# Patient Record
Sex: FEMALE | Race: WHITE
[De-identification: names, ages, dates, MRNs, and addresses within clinical notes are randomized per-mention and may not be internally consistent; named-entity substitution may affect disease eponyms.]

---

## 2022-01-03 ENCOUNTER — APPOINTMENT (OUTPATIENT)
Dept: ULTRASOUND IMAGING | Facility: CLINIC | Age: 62
End: 2022-01-03
Payer: COMMERCIAL

## 2022-01-03 PROBLEM — Z00.00 ENCOUNTER FOR PREVENTIVE HEALTH EXAMINATION: Status: ACTIVE | Noted: 2022-01-03

## 2022-01-03 PROCEDURE — 93971 EXTREMITY STUDY: CPT | Mod: LT

## 2022-01-17 ENCOUNTER — TRANSCRIPTION ENCOUNTER (OUTPATIENT)
Age: 62
End: 2022-01-17

## 2023-06-08 ENCOUNTER — NON-APPOINTMENT (OUTPATIENT)
Age: 63
End: 2023-06-08

## 2023-10-02 ENCOUNTER — NON-APPOINTMENT (OUTPATIENT)
Age: 63
End: 2023-10-02

## 2024-01-31 ENCOUNTER — NON-APPOINTMENT (OUTPATIENT)
Age: 64
End: 2024-01-31

## 2024-09-21 ENCOUNTER — NON-APPOINTMENT (OUTPATIENT)
Age: 64
End: 2024-09-21

## 2025-03-14 ENCOUNTER — INPATIENT (INPATIENT)
Facility: HOSPITAL | Age: 65
LOS: 3 days | Discharge: HOME | DRG: 871 | End: 2025-03-18
Attending: HOSPITALIST | Admitting: INTERNAL MEDICINE
Payer: COMMERCIAL

## 2025-03-14 ENCOUNTER — NON-APPOINTMENT (OUTPATIENT)
Age: 65
End: 2025-03-14

## 2025-03-14 VITALS
TEMPERATURE: 101 F | OXYGEN SATURATION: 98 % | WEIGHT: 125 LBS | RESPIRATION RATE: 18 BRPM | SYSTOLIC BLOOD PRESSURE: 130 MMHG | HEART RATE: 96 BPM | HEIGHT: 70 IN | DIASTOLIC BLOOD PRESSURE: 72 MMHG

## 2025-03-14 DIAGNOSIS — K76.89 OTHER SPECIFIED DISEASES OF LIVER: ICD-10-CM

## 2025-03-14 DIAGNOSIS — Z98.890 OTHER SPECIFIED POSTPROCEDURAL STATES: Chronic | ICD-10-CM

## 2025-03-14 LAB
ALBUMIN SERPL ELPH-MCNC: 3.6 G/DL — SIGNIFICANT CHANGE UP (ref 3.3–5)
ALP SERPL-CCNC: 53 U/L — SIGNIFICANT CHANGE UP (ref 40–120)
ALT FLD-CCNC: 33 U/L — SIGNIFICANT CHANGE UP (ref 10–45)
ANION GAP SERPL CALC-SCNC: 9 MMOL/L — SIGNIFICANT CHANGE UP (ref 5–17)
APPEARANCE UR: CLEAR — SIGNIFICANT CHANGE UP
APTT BLD: 32 SEC — SIGNIFICANT CHANGE UP (ref 24.5–35.6)
AST SERPL-CCNC: 41 U/L — HIGH (ref 10–40)
BASOPHILS # BLD AUTO: 0.03 K/UL — SIGNIFICANT CHANGE UP (ref 0–0.2)
BASOPHILS NFR BLD AUTO: 0.2 % — SIGNIFICANT CHANGE UP (ref 0–2)
BILIRUB SERPL-MCNC: 0.4 MG/DL — SIGNIFICANT CHANGE UP (ref 0.2–1.2)
BILIRUB UR-MCNC: NEGATIVE — SIGNIFICANT CHANGE UP
BUN SERPL-MCNC: 9 MG/DL — SIGNIFICANT CHANGE UP (ref 7–23)
CALCIUM SERPL-MCNC: 8.7 MG/DL — SIGNIFICANT CHANGE UP (ref 8.4–10.5)
CHLORIDE SERPL-SCNC: 99 MMOL/L — SIGNIFICANT CHANGE UP (ref 96–108)
CO2 SERPL-SCNC: 26 MMOL/L — SIGNIFICANT CHANGE UP (ref 22–31)
COLOR SPEC: YELLOW — SIGNIFICANT CHANGE UP
CREAT SERPL-MCNC: 0.5 MG/DL — SIGNIFICANT CHANGE UP (ref 0.5–1.3)
DIFF PNL FLD: NEGATIVE — SIGNIFICANT CHANGE UP
EGFR: 105 ML/MIN/1.73M2 — SIGNIFICANT CHANGE UP
EGFR: 105 ML/MIN/1.73M2 — SIGNIFICANT CHANGE UP
EOSINOPHIL # BLD AUTO: 0 K/UL — SIGNIFICANT CHANGE UP (ref 0–0.5)
EOSINOPHIL NFR BLD AUTO: 0 % — SIGNIFICANT CHANGE UP (ref 0–6)
GLUCOSE SERPL-MCNC: 114 MG/DL — HIGH (ref 70–99)
GLUCOSE UR QL: NEGATIVE MG/DL — SIGNIFICANT CHANGE UP
HCT VFR BLD CALC: 35.1 % — SIGNIFICANT CHANGE UP (ref 34.5–45)
IMM GRANULOCYTES NFR BLD AUTO: 0.5 % — SIGNIFICANT CHANGE UP (ref 0–0.9)
INR BLD: 0.91 RATIO — SIGNIFICANT CHANGE UP (ref 0.85–1.16)
KETONES UR-MCNC: NEGATIVE MG/DL — SIGNIFICANT CHANGE UP
LEUKOCYTE ESTERASE UR-ACNC: NEGATIVE — SIGNIFICANT CHANGE UP
LYMPHOCYTES # BLD AUTO: 0.33 K/UL — LOW (ref 1–3.3)
LYMPHOCYTES # BLD AUTO: 2.2 % — LOW (ref 13–44)
MCHC RBC-ENTMCNC: 31.3 PG — SIGNIFICANT CHANGE UP (ref 27–34)
MCHC RBC-ENTMCNC: 33.6 G/DL — SIGNIFICANT CHANGE UP (ref 32–36)
MCV RBC AUTO: 93.1 FL — SIGNIFICANT CHANGE UP (ref 80–100)
MONOCYTES # BLD AUTO: 0.93 K/UL — HIGH (ref 0–0.9)
MONOCYTES NFR BLD AUTO: 6.1 % — SIGNIFICANT CHANGE UP (ref 2–14)
NEUTROPHILS NFR BLD AUTO: 91 % — HIGH (ref 43–77)
NRBC BLD AUTO-RTO: 0 /100 WBCS — SIGNIFICANT CHANGE UP (ref 0–0)
PH UR: 8 — SIGNIFICANT CHANGE UP (ref 5–8)
PLATELET # BLD AUTO: 189 K/UL — SIGNIFICANT CHANGE UP (ref 150–400)
POTASSIUM SERPL-MCNC: 3.8 MMOL/L — SIGNIFICANT CHANGE UP (ref 3.5–5.3)
PROT UR-MCNC: NEGATIVE MG/DL — SIGNIFICANT CHANGE UP
PROTHROM AB SERPL-ACNC: 10.7 SEC — SIGNIFICANT CHANGE UP (ref 9.9–13.4)
RBC # BLD: 3.77 M/UL — LOW (ref 3.8–5.2)
RBC # FLD: 12.1 % — SIGNIFICANT CHANGE UP (ref 10.3–14.5)
SODIUM SERPL-SCNC: 134 MMOL/L — LOW (ref 135–145)
SP GR SPEC: 1.01 — SIGNIFICANT CHANGE UP (ref 1–1.03)
UROBILINOGEN FLD QL: 0.2 MG/DL — SIGNIFICANT CHANGE UP (ref 0.2–1)
WBC # BLD: 15.13 K/UL — HIGH (ref 3.8–10.5)
WBC # FLD AUTO: 15.13 K/UL — HIGH (ref 3.8–10.5)

## 2025-03-14 PROCEDURE — 71045 X-RAY EXAM CHEST 1 VIEW: CPT | Mod: 26

## 2025-03-14 PROCEDURE — 99223 1ST HOSP IP/OBS HIGH 75: CPT

## 2025-03-14 PROCEDURE — 99285 EMERGENCY DEPT VISIT HI MDM: CPT

## 2025-03-14 PROCEDURE — 93010 ELECTROCARDIOGRAM REPORT: CPT

## 2025-03-14 RX ORDER — BUTYROSPERMUM PARKII(SHEA BUTTER), SIMMONDSIA CHINENSIS (JOJOBA) SEED OIL, ALOE BARBADENSIS LEAF EXTRACT .01; 1; 3.5 G/100G; G/100G; G/100G
250 LIQUID TOPICAL
Refills: 0 | Status: DISCONTINUED | OUTPATIENT
Start: 2025-03-14 | End: 2025-03-18

## 2025-03-14 RX ORDER — MAGNESIUM, ALUMINUM HYDROXIDE 200-200 MG
30 TABLET,CHEWABLE ORAL EVERY 4 HOURS
Refills: 0 | Status: DISCONTINUED | OUTPATIENT
Start: 2025-03-14 | End: 2025-03-18

## 2025-03-14 RX ORDER — ACETAMINOPHEN 500 MG/5ML
650 LIQUID (ML) ORAL EVERY 6 HOURS
Refills: 0 | Status: DISCONTINUED | OUTPATIENT
Start: 2025-03-14 | End: 2025-03-18

## 2025-03-14 RX ORDER — VANCOMYCIN HCL IN 5 % DEXTROSE 1.5G/250ML
750 PLASTIC BAG, INJECTION (ML) INTRAVENOUS EVERY 12 HOURS
Refills: 0 | Status: DISCONTINUED | OUTPATIENT
Start: 2025-03-14 | End: 2025-03-14

## 2025-03-14 RX ORDER — CLINDAMYCIN PHOSPHATE 150 MG/ML
600 VIAL (ML) INJECTION EVERY 8 HOURS
Refills: 0 | Status: DISCONTINUED | OUTPATIENT
Start: 2025-03-14 | End: 2025-03-14

## 2025-03-14 RX ORDER — LACTULOSE 10 G/15ML
40 SOLUTION ORAL
Refills: 0 | Status: DISCONTINUED | OUTPATIENT
Start: 2025-03-14 | End: 2025-03-18

## 2025-03-14 RX ORDER — ONDANSETRON HCL/PF 4 MG/2 ML
4 VIAL (ML) INJECTION EVERY 8 HOURS
Refills: 0 | Status: DISCONTINUED | OUTPATIENT
Start: 2025-03-14 | End: 2025-03-18

## 2025-03-14 RX ORDER — LACTULOSE 10 G/15ML
20 SOLUTION ORAL
Refills: 0 | Status: DISCONTINUED | OUTPATIENT
Start: 2025-03-15 | End: 2025-03-18

## 2025-03-14 RX ORDER — MELATONIN 5 MG
3 TABLET ORAL AT BEDTIME
Refills: 0 | Status: DISCONTINUED | OUTPATIENT
Start: 2025-03-14 | End: 2025-03-18

## 2025-03-14 RX ORDER — CYANOCOBALAMIN 1000 UG/ML
1000 INJECTION INTRAMUSCULAR; SUBCUTANEOUS DAILY
Refills: 0 | Status: DISCONTINUED | OUTPATIENT
Start: 2025-03-15 | End: 2025-03-18

## 2025-03-14 RX ORDER — TRAMADOL HYDROCHLORIDE 50 MG/1
50 TABLET, FILM COATED ORAL EVERY 6 HOURS
Refills: 0 | Status: DISCONTINUED | OUTPATIENT
Start: 2025-03-14 | End: 2025-03-18

## 2025-03-14 RX ORDER — SPIRONOLACTONE 25 MG
12.5 TABLET ORAL
Refills: 0 | Status: DISCONTINUED | OUTPATIENT
Start: 2025-03-14 | End: 2025-03-18

## 2025-03-14 RX ORDER — ENOXAPARIN SODIUM 100 MG/ML
30 INJECTION SUBCUTANEOUS EVERY 24 HOURS
Refills: 0 | Status: DISCONTINUED | OUTPATIENT
Start: 2025-03-14 | End: 2025-03-15

## 2025-03-14 RX ORDER — B1/B2/B3/B5/B6/B12/VIT C/FOLIC 500-0.5 MG
1 TABLET ORAL DAILY
Refills: 0 | Status: DISCONTINUED | OUTPATIENT
Start: 2025-03-14 | End: 2025-03-18

## 2025-03-14 RX ORDER — CALCIUM CARBONATE/VITAMIN D3 500MG-5MCG
1 TABLET ORAL
Refills: 0 | Status: DISCONTINUED | OUTPATIENT
Start: 2025-03-14 | End: 2025-03-18

## 2025-03-14 RX ORDER — CEFAZOLIN SODIUM IN 0.9 % NACL 3 G/100 ML
2000 INTRAVENOUS SOLUTION, PIGGYBACK (ML) INTRAVENOUS EVERY 8 HOURS
Refills: 0 | Status: DISCONTINUED | OUTPATIENT
Start: 2025-03-14 | End: 2025-03-16

## 2025-03-14 RX ORDER — KETOROLAC TROMETHAMINE 30 MG/ML
30 INJECTION, SOLUTION INTRAMUSCULAR; INTRAVENOUS ONCE
Refills: 0 | Status: DISCONTINUED | OUTPATIENT
Start: 2025-03-14 | End: 2025-03-14

## 2025-03-14 RX ORDER — CLINDAMYCIN PHOSPHATE 150 MG/ML
900 VIAL (ML) INJECTION ONCE
Refills: 0 | Status: COMPLETED | OUTPATIENT
Start: 2025-03-14 | End: 2025-03-14

## 2025-03-14 RX ORDER — FOLIC ACID 1 MG/1
1 TABLET ORAL DAILY
Refills: 0 | Status: DISCONTINUED | OUTPATIENT
Start: 2025-03-14 | End: 2025-03-18

## 2025-03-14 RX ORDER — FUROSEMIDE 10 MG/ML
10 INJECTION INTRAMUSCULAR; INTRAVENOUS
Refills: 0 | Status: DISCONTINUED | OUTPATIENT
Start: 2025-03-17 | End: 2025-03-16

## 2025-03-14 RX ORDER — KETOROLAC TROMETHAMINE 30 MG/ML
15 INJECTION, SOLUTION INTRAMUSCULAR; INTRAVENOUS EVERY 8 HOURS
Refills: 0 | Status: DISCONTINUED | OUTPATIENT
Start: 2025-03-14 | End: 2025-03-16

## 2025-03-14 RX ADMIN — BUTYROSPERMUM PARKII(SHEA BUTTER), SIMMONDSIA CHINENSIS (JOJOBA) SEED OIL, ALOE BARBADENSIS LEAF EXTRACT 250 MILLIGRAM(S): .01; 1; 3.5 LIQUID TOPICAL at 21:30

## 2025-03-14 RX ADMIN — Medication 100 MILLIGRAM(S): at 17:24

## 2025-03-14 RX ADMIN — Medication 1750 MILLILITER(S): at 18:30

## 2025-03-14 RX ADMIN — KETOROLAC TROMETHAMINE 30 MILLIGRAM(S): 30 INJECTION, SOLUTION INTRAMUSCULAR; INTRAVENOUS at 17:29

## 2025-03-14 RX ADMIN — KETOROLAC TROMETHAMINE 30 MILLIGRAM(S): 30 INJECTION, SOLUTION INTRAMUSCULAR; INTRAVENOUS at 18:30

## 2025-03-14 RX ADMIN — ENOXAPARIN SODIUM 30 MILLIGRAM(S): 100 INJECTION SUBCUTANEOUS at 21:29

## 2025-03-14 RX ADMIN — Medication 1750 MILLILITER(S): at 17:24

## 2025-03-14 RX ADMIN — KETOROLAC TROMETHAMINE 15 MILLIGRAM(S): 30 INJECTION, SOLUTION INTRAMUSCULAR; INTRAVENOUS at 23:35

## 2025-03-14 RX ADMIN — Medication 100 MILLIGRAM(S): at 21:24

## 2025-03-14 RX ADMIN — KETOROLAC TROMETHAMINE 15 MILLIGRAM(S): 30 INJECTION, SOLUTION INTRAMUSCULAR; INTRAVENOUS at 22:45

## 2025-03-14 NOTE — ED PROVIDER NOTE - PHYSICAL EXAMINATION
Gen: Well appearing in NAD  Head: NC/AT  Neck: trachea midline  Resp:  No distress  Ext: no deformities  Neuro:  A&O appears non focal  Skin:  Warm and dry as visualized, right lower forearm with erythema, warmth tenderness and swelling. Signs of lymphangitis up the arm noted as well. DIstal neurovasc intact.

## 2025-03-14 NOTE — CONSULT NOTE ADULT - SUBJECTIVE AND OBJECTIVE BOX
HPI:   Patient is a 65y female with history of liver disease from hyperplasia that has improved who developed acute onset of red swollen right arm starting this morning. She has fever. Yesterday she felt a little woozy when she working on her mother's wounds. No animal exposure. No definite trauma but has some cuts on the hands. No n,v,d. She may have had this in the past     REVIEW OF SYSTEMS:  All other review of systems negative (Comprehensive ROS)    PAST MEDICAL & SURGICAL HISTORY:  Hepatic focal nodular hyperplasia      H/O hernia repair      S/P ORIF (open reduction internal fixation) fracture          Allergies    penicillin (Unknown)    Intolerances        Antimicrobials Day #  :1  ceFAZolin   IVPB 2000 milliGRAM(s) IV Intermittent every 8 hours    Other Medications:  acetaminophen     Tablet .. 650 milliGRAM(s) Oral every 6 hours PRN  aluminum hydroxide/magnesium hydroxide/simethicone Suspension 30 milliLiter(s) Oral every 4 hours PRN  calcium carbonate 1250 mG  + Vitamin D (OsCal 500 + D) 1 Tablet(s) Oral two times a day  enoxaparin Injectable 30 milliGRAM(s) SubCutaneous every 24 hours  folic acid 1 milliGRAM(s) Oral daily  furosemide    Tablet 10 milliGRAM(s) Oral <User Schedule>  ketorolac   Injectable 15 milliGRAM(s) IV Push every 8 hours PRN  lactulose Syrup 20 Gram(s) Oral <User Schedule>  lactulose Syrup 40 Gram(s) Oral <User Schedule>  melatonin 3 milliGRAM(s) Oral at bedtime PRN  multivitamin 1 Tablet(s) Oral daily  ondansetron Injectable 4 milliGRAM(s) IV Push every 8 hours PRN  saccharomyces boulardii 250 milliGRAM(s) Oral two times a day  spironolactone 12.5 milliGRAM(s) Oral <User Schedule>  traMADol 50 milliGRAM(s) Oral every 6 hours PRN      FAMILY HISTORY:  FH: melanoma (Mother)    FH: HTN (hypertension) (Mother)    FH: breast cancer (Mother)        SOCIAL HISTORY:  Smoking: [ ]Yes [ x]No  ETOH: [ ]Yes [x ]No  Drug Use: [ ]Yes [ ]xNo   [x ] Single[ ]    T(F): 99.2 (25 @ 19:42), Max: 100.8 (25 @ 15:56)  HR: 81 (25 @ 19:42)  BP: 135/78 (25 @ 19:42)  RR: 17 (25 @ 19:42)  SpO2: 99% (25 @ 19:42)  Wt(kg): --    PHYSICAL EXAM:  General: alert, no acute distress  Eyes:  anicteric, no conjunctival injection, no discharge  Oropharynx: no lesions or injection 	  Neck: supple, without adenopathy  Abdomen: , nondistended, Skin: no lesions  Extremities: right arm is red hand to elbow. no bulla  Neurologic: alert, oriented, moves all extremities    LAB RESULTS:                        11.8   15.13 )-----------( 189      ( 14 Mar 2025 17:00 )             35.1         134[L]  |  99  |  9   ----------------------------<  114[H]  3.8   |  26  |  0.50    Ca    8.7      14 Mar 2025 17:00    TPro  6.5  /  Alb  3.6  /  TBili  0.4  /  DBili  x   /  AST  41[H]  /  ALT  33  /  AlkPhos  53      LIVER FUNCTIONS - ( 14 Mar 2025 17:00 )  Alb: 3.6 g/dL / Pro: 6.5 g/dL / ALK PHOS: 53 U/L / ALT: 33 U/L / AST: 41 U/L / GGT: x           Urinalysis Basic - ( 14 Mar 2025 18:14 )    Color: Yellow / Appearance: Clear / S.006 / pH: x  Gluc: x / Ketone: Negative mg/dL  / Bili: Negative / Urobili: 0.2 mg/dL   Blood: x / Protein: Negative mg/dL / Nitrite: Negative   Leuk Esterase: Negative / RBC: x / WBC x   Sq Epi: x / Non Sq Epi: x / Bacteria: x        MICROBIOLOGY:  RECENT CULTURES:        RADIOLOGY REVIEWED:  < from: Xray Chest 1 View AP/PA (25 @ 17:19) >  ACC: [id number] EXAM:  XR CHEST AP OR PA 1V   ORDERED BY: RIO JAMA     PROCEDURE DATE:  2025          INTERPRETATION:  AP chest on 2025 5:08 PM. Patient has sepsis.    COMPARISON: None available.    Heart size is within normal limits.    Lungs are clear.    IMPRESSION: Negative chest.    --- End of Report ---      < end of copied text >    Impression:  65y female with history of liver disease from hyperplasia that has improved who developed acute onset of red swollen right arm starting this morning. She has fever. Yesterday she felt a little woozy when she working on her mother's wounds. No animal exposure. No definite trauma but has some cuts on the hands. No n,v,d. She may have had this in the past . Patient with cellulitis of the right arm. Tempo is most consistent with beta strept given how fast it evolved. Patient with penicillin allergy but tolerates keflex.   Recommendations:  treat with cefazolin  elevate arm  f/u blood cx

## 2025-03-14 NOTE — H&P ADULT - ASSESSMENT
64 yo F with history of hepatic nodular hyperplasia presents for one day history of R lower arm pain, erythema and swelling.  Pt woke up with Right lower arm pain. Then redness and swelling of the arm worsened rapidly with associated fever and chills which prompted pt to come to the ED.      Sepsis POA (fever and leukocytosis) due to right lower arm cellulitis   - cont IV Vancomycin   - ID consult  - f/u blood cx  - probiotics    Hepatic nodular hyperplasia  - takes lactulose 30ml in AM, 60mg in PM, lasix 10mg daily (Mon to Wed), Spironolactone 12.5mg (Mon to Wed)  - c/w home supplement: vit B1, vit B12, folic acid, multivitamin, Sha- vit D    DVT ppx: lovenox subq

## 2025-03-14 NOTE — H&P ADULT - NSHPLABSRESULTS_GEN_ALL_CORE
LABS:                        11.8   15.13 )-----------( 189      ( 14 Mar 2025 17:00 )             35.1     03-14    134[L]  |  99  |  9   ----------------------------<  114[H]  3.8   |  26  |  0.50    Ca    8.7      14 Mar 2025 17:00    TPro  6.5  /  Alb  3.6  /  TBili  0.4  /  DBili  x   /  AST  41[H]  /  ALT  33  /  AlkPhos  53  03-14    PT/INR - ( 14 Mar 2025 17:00 )   PT: 10.7 sec;   INR: 0.91 ratio         PTT - ( 14 Mar 2025 17:00 )  PTT:32.0 sec  Urinalysis Basic - ( 14 Mar 2025 18:14 )    Color: Yellow / Appearance: Clear / S.006 / pH: x  Gluc: x / Ketone: Negative mg/dL  / Bili: Negative / Urobili: 0.2 mg/dL   Blood: x / Protein: Negative mg/dL / Nitrite: Negative   Leuk Esterase: Negative / RBC: x / WBC x   Sq Epi: x / Non Sq Epi: x / Bacteria: x       CAPILLARY BLOOD GLUCOSE      Urinalysis Basic - ( 14 Mar 2025 18:14 )    Color: Yellow / Appearance: Clear / S.006 / pH: x  Gluc: x / Ketone: Negative mg/dL  / Bili: Negative / Urobili: 0.2 mg/dL   Blood: x / Protein: Negative mg/dL / Nitrite: Negative   Leuk Esterase: Negative / RBC: x / WBC x   Sq Epi: x / Non Sq Epi: x / Bacteria: x        RADIOLOGY & ADDITIONAL TESTS:    Consultant(s) Notes Reviewed:  [x ] YES  [ ] NO  Care Discussed with Consultants/Other Providers [ x] YES  [ ] NO spoke to Dr. Brown. cont IV abx  Imaging Personally Reviewed:  [ ] YES  [ ] NO

## 2025-03-14 NOTE — ED PROVIDER NOTE - OBJECTIVE STATEMENT
66 yo F with history of hepatic nodular hyperplasia presents for one day history of R lower arm pain, erythema and swelling. Pt reports that her mother has a leg wound with purulent drainage and patient has been helping with wound care. This morning, pt woke up with Right lower arm pain. Then redness and swelling of the arm worsened rapidly with associated fever and chills which prompted pt to come to the ED. Pt feels infection may have entered within her nails as she was really getting into the wounds with her bare hands. + fever.

## 2025-03-14 NOTE — H&P ADULT - HISTORY OF PRESENT ILLNESS
RUE cellulitis  64 yo F with history of hepatic nodular hyperplasia presents for one day history of R lower arm pain, erythema and swelling. Pt reports that her mother has a leg wound with purulent drainage and patient has been helping with wound care. This morning, pt woke up with Right lower arm pain. The arm quickly became red and swollen with associated fever and chills which prompted pt to come to the ED.  In ED, pt was febrile to 100.8, WBC 15. found with R arm cellulitis. s/p IV clindamycin x1 and IVF bolus. 66 yo F with history of hepatic nodular hyperplasia presents for one day history of R lower arm pain, erythema and swelling. Pt reports that her mother has a leg wound with purulent drainage and patient has been helping with wound care. This morning, pt woke up with Right lower arm pain. Then redness and swelling of the arm worsened rapidly with associated fever and chills which prompted pt to come to the ED.  In ED, pt was febrile to 100.8, WBC 15. found with R arm cellulitis. s/p IV clindamycin x1 and IVF bolus.

## 2025-03-14 NOTE — PATIENT PROFILE ADULT - FUNCTIONAL SCREEN CURRENT LEVEL: COMMUNICATION, MLM
Patient understands there is an increased risk of corneal edema after cataract surgery. 0 = understands/communicates without difficulty

## 2025-03-14 NOTE — ED PROVIDER NOTE - DISPOSITION TYPE
ED to Inpatient Handoff Report    Notified jesus that electronic handoff available and patient ready for transport to room 0620.    Safety Risks: None identified and Risk of falls    Patient in Restraints: no    Constant Observer or Patient : no    Telemetry Monitoring Ordered :No           Order to transfer to unit without monitor:N/A    Last MEWS: 01 Time completed: 1906    Deterioration Index Score:   Predictive Model Details          23 (Normal)  Factor Value    Calculated 12/25/2024 19:25 67% Age 89 years old    Deterioration Index Model 11% Respiratory rate 18     8% Potassium 4.3 mmol/L     7% Systolic 141     6% Sodium 142 mmol/L     1% Temperature 99 °F (37.2 °C)     1% Pulse 82     0% Pulse oximetry 95 %     0% Hematocrit 36.5 %     0% WBC count 7.1 k/uL        Vitals:    12/25/24 1650 12/25/24 1906   BP: (!) 154/66 (!) 141/70   Pulse: (!) 105 82   Resp: 18 18   Temp: 97.4 °F (36.3 °C) 99 °F (37.2 °C)   TempSrc: Oral Oral   SpO2: 96% 95%   Weight: 74.4 kg (164 lb)    Height: 1.499 m (4' 11\")          Opportunity for questions and clarification was provided.      No ADMIT

## 2025-03-14 NOTE — ED ADULT NURSE NOTE - OBJECTIVE STATEMENT
Patient presents to ED complaining of right arm redness, pain, and swelling. Patient's mother is admitted with similar symptoms. Patient states she was taking care of mother and symptoms came on suddenly today. patient febrile in triage. Patient states history of liver disease. Patient denies chest pain, denies shortness of breath.

## 2025-03-14 NOTE — ED ADULT NURSE NOTE - NSFALLUNIVINTERV_ED_ALL_ED
Bed/Stretcher in lowest position, wheels locked, appropriate side rails in place/Call bell, personal items and telephone in reach/Instruct patient to call for assistance before getting out of bed/chair/stretcher/Non-slip footwear applied when patient is off stretcher/[place] to call system/Physically safe environment - no spills, clutter or unnecessary equipment/Purposeful proactive rounding/Room/bathroom lighting operational, light cord in reach

## 2025-03-14 NOTE — PATIENT PROFILE ADULT - FALL HARM RISK - UNIVERSAL INTERVENTIONS
Bed in lowest position, wheels locked, appropriate side rails in place/Call bell, personal items and telephone in reach/Instruct patient to call for assistance before getting out of bed or chair/Non-slip footwear when patient is out of bed/[place] to call system/Physically safe environment - no spills, clutter or unnecessary equipment/Purposeful Proactive Rounding/Room/bathroom lighting operational, light cord in reach

## 2025-03-14 NOTE — ED ADULT NURSE NOTE - NSICDXFAMILYHX_GEN_ALL_CORE_FT
FAMILY HISTORY:  Mother  Still living? Unknown  FH: breast cancer, Age at diagnosis: Age Unknown  FH: HTN (hypertension), Age at diagnosis: Age Unknown  FH: melanoma, Age at diagnosis: Age Unknown

## 2025-03-14 NOTE — ED ADULT NURSE NOTE - NSICDXPASTSURGICALHX_GEN_ALL_CORE_FT
PAST SURGICAL HISTORY:  H/O hernia repair     S/P ORIF (open reduction internal fixation) fracture

## 2025-03-14 NOTE — ED PROVIDER NOTE - CLINICAL SUMMARY MEDICAL DECISION MAKING FREE TEXT BOX
[FreeTextEntry1] : Continue symptomatic treatment with Allegra\par Short interval follow-up if symptoms do not improve\par Patient is in agreement with moderate intensity statin therapy
[FreeTextEntry1] : Continue symptomatic treatment with Allegra\par Short interval follow-up if symptoms do not improve\par Patient is in agreement with moderate intensity statin therapy
66 yo F with history of hepatic nodular hyperplasia presents for one day history of R lower arm pain, erythema and swelling. Pt reports that her mother has a leg wound with purulent drainage and patient has been helping with wound care. This morning, pt woke up with Right lower arm pain. Then redness and swelling of the arm worsened rapidly with associated fever and chills which prompted pt to come to the ED. Pt feels infection may have entered within her nails as she was really getting into the wounds with her bare hands. + fever.   Exam as stated. Plan for admission for IV abx for advanced cellulitis with signs of systemic sx (fever, lymphangitis)

## 2025-03-15 LAB
ANION GAP SERPL CALC-SCNC: 7 MMOL/L — SIGNIFICANT CHANGE UP (ref 5–17)
BASOPHILS # BLD AUTO: 0.03 K/UL — SIGNIFICANT CHANGE UP (ref 0–0.2)
BASOPHILS NFR BLD AUTO: 0.3 % — SIGNIFICANT CHANGE UP (ref 0–2)
BUN SERPL-MCNC: 10 MG/DL — SIGNIFICANT CHANGE UP (ref 7–23)
CALCIUM SERPL-MCNC: 8 MG/DL — LOW (ref 8.4–10.5)
CHLORIDE SERPL-SCNC: 102 MMOL/L — SIGNIFICANT CHANGE UP (ref 96–108)
CREAT SERPL-MCNC: 0.47 MG/DL — LOW (ref 0.5–1.3)
EGFR: 106 ML/MIN/1.73M2 — SIGNIFICANT CHANGE UP
EGFR: 106 ML/MIN/1.73M2 — SIGNIFICANT CHANGE UP
EOSINOPHIL # BLD AUTO: 0.01 K/UL — SIGNIFICANT CHANGE UP (ref 0–0.5)
EOSINOPHIL NFR BLD AUTO: 0.1 % — SIGNIFICANT CHANGE UP (ref 0–6)
GLUCOSE SERPL-MCNC: 101 MG/DL — HIGH (ref 70–99)
HCT VFR BLD CALC: 32.6 % — LOW (ref 34.5–45)
HGB BLD-MCNC: 10.6 G/DL — LOW (ref 11.5–15.5)
IMM GRANULOCYTES NFR BLD AUTO: 0.2 % — SIGNIFICANT CHANGE UP (ref 0–0.9)
LYMPHOCYTES # BLD AUTO: 0.43 K/UL — LOW (ref 1–3.3)
LYMPHOCYTES # BLD AUTO: 4.5 % — LOW (ref 13–44)
MCHC RBC-ENTMCNC: 30.7 PG — SIGNIFICANT CHANGE UP (ref 27–34)
MCHC RBC-ENTMCNC: 32.5 G/DL — SIGNIFICANT CHANGE UP (ref 32–36)
MCV RBC AUTO: 94.5 FL — SIGNIFICANT CHANGE UP (ref 80–100)
MONOCYTES # BLD AUTO: 0.53 K/UL — SIGNIFICANT CHANGE UP (ref 0–0.9)
MONOCYTES NFR BLD AUTO: 5.5 % — SIGNIFICANT CHANGE UP (ref 2–14)
NEUTROPHILS # BLD AUTO: 8.6 K/UL — HIGH (ref 1.8–7.4)
NEUTROPHILS NFR BLD AUTO: 89.4 % — HIGH (ref 43–77)
NRBC BLD AUTO-RTO: 0 /100 WBCS — SIGNIFICANT CHANGE UP (ref 0–0)
PLATELET # BLD AUTO: 158 K/UL — SIGNIFICANT CHANGE UP (ref 150–400)
POTASSIUM SERPL-MCNC: 4 MMOL/L — SIGNIFICANT CHANGE UP (ref 3.5–5.3)
RBC # BLD: 3.45 M/UL — LOW (ref 3.8–5.2)
RBC # FLD: 12.2 % — SIGNIFICANT CHANGE UP (ref 10.3–14.5)
SODIUM SERPL-SCNC: 134 MMOL/L — LOW (ref 135–145)
WBC # BLD: 9.62 K/UL — SIGNIFICANT CHANGE UP (ref 3.8–10.5)
WBC # FLD AUTO: 9.62 K/UL — SIGNIFICANT CHANGE UP (ref 3.8–10.5)

## 2025-03-15 PROCEDURE — 99233 SBSQ HOSP IP/OBS HIGH 50: CPT

## 2025-03-15 PROCEDURE — 73090 X-RAY EXAM OF FOREARM: CPT | Mod: 26,LT

## 2025-03-15 RX ORDER — LEVOTHYROXINE SODIUM 300 MCG
50 TABLET ORAL AT BEDTIME
Refills: 0 | Status: DISCONTINUED | OUTPATIENT
Start: 2025-03-15 | End: 2025-03-18

## 2025-03-15 RX ORDER — HYPROMELLOSE 0.4 %
1 DROPS OPHTHALMIC (EYE)
Refills: 0 | Status: DISCONTINUED | OUTPATIENT
Start: 2025-03-15 | End: 2025-03-18

## 2025-03-15 RX ORDER — SENNA 187 MG
2 TABLET ORAL AT BEDTIME
Refills: 0 | Status: DISCONTINUED | OUTPATIENT
Start: 2025-03-15 | End: 2025-03-18

## 2025-03-15 RX ADMIN — KETOROLAC TROMETHAMINE 15 MILLIGRAM(S): 30 INJECTION, SOLUTION INTRAMUSCULAR; INTRAVENOUS at 23:21

## 2025-03-15 RX ADMIN — BUTYROSPERMUM PARKII(SHEA BUTTER), SIMMONDSIA CHINENSIS (JOJOBA) SEED OIL, ALOE BARBADENSIS LEAF EXTRACT 250 MILLIGRAM(S): .01; 1; 3.5 LIQUID TOPICAL at 05:51

## 2025-03-15 RX ADMIN — KETOROLAC TROMETHAMINE 15 MILLIGRAM(S): 30 INJECTION, SOLUTION INTRAMUSCULAR; INTRAVENOUS at 22:37

## 2025-03-15 RX ADMIN — Medication 100 MILLIGRAM(S): at 21:07

## 2025-03-15 RX ADMIN — Medication 1 TABLET(S): at 11:12

## 2025-03-15 RX ADMIN — LACTULOSE 40 GRAM(S): 10 SOLUTION ORAL at 17:18

## 2025-03-15 RX ADMIN — BUTYROSPERMUM PARKII(SHEA BUTTER), SIMMONDSIA CHINENSIS (JOJOBA) SEED OIL, ALOE BARBADENSIS LEAF EXTRACT 250 MILLIGRAM(S): .01; 1; 3.5 LIQUID TOPICAL at 17:16

## 2025-03-15 RX ADMIN — CYANOCOBALAMIN 1000 MICROGRAM(S): 1000 INJECTION INTRAMUSCULAR; SUBCUTANEOUS at 11:12

## 2025-03-15 RX ADMIN — Medication 50 MICROGRAM(S): at 21:08

## 2025-03-15 RX ADMIN — Medication 100 MILLIGRAM(S): at 13:15

## 2025-03-15 RX ADMIN — Medication 2 TABLET(S): at 21:08

## 2025-03-15 RX ADMIN — Medication 1 TABLET(S): at 05:51

## 2025-03-15 RX ADMIN — FOLIC ACID 1 MILLIGRAM(S): 1 TABLET ORAL at 11:13

## 2025-03-15 RX ADMIN — Medication 1 DROP(S): at 17:49

## 2025-03-15 RX ADMIN — Medication 100 MILLIGRAM(S): at 05:50

## 2025-03-15 RX ADMIN — Medication 1 TABLET(S): at 17:17

## 2025-03-15 RX ADMIN — LACTULOSE 20 GRAM(S): 10 SOLUTION ORAL at 05:50

## 2025-03-15 NOTE — CHART NOTE - NSCHARTNOTEFT_GEN_A_CORE
Per Plastics attending - xray of right arm and duplex ordered  Will consider hand consult in am  Will exam patients arm overnight to monitor for compartment syndrome (low suspicion)

## 2025-03-15 NOTE — DIETITIAN INITIAL EVALUATION ADULT - PERTINENT LABORATORY DATA
03-15    134[L]  |  102  |  10  ----------------------------<  101[H]  4.0   |  25  |  0.47[L]    Ca    8.0[L]      15 Mar 2025 05:25    TPro  6.5  /  Alb  3.6  /  TBili  0.4  /  DBili  x   /  AST  41[H]  /  ALT  33  /  AlkPhos  53  03-14

## 2025-03-15 NOTE — DIETITIAN INITIAL EVALUATION ADULT - PERTINENT MEDS FT
MEDICATIONS  (STANDING):  artificial  tears Solution 1 Drop(s) Both EYES two times a day  calcium carbonate 1250 mG  + Vitamin D (OsCal 500 + D) 1 Tablet(s) Oral two times a day  ceFAZolin   IVPB 2000 milliGRAM(s) IV Intermittent every 8 hours  cyanocobalamin 1000 MICROGram(s) Oral daily  folic acid 1 milliGRAM(s) Oral daily  furosemide    Tablet 10 milliGRAM(s) Oral <User Schedule>  lactulose Syrup 20 Gram(s) Oral <User Schedule>  lactulose Syrup 40 Gram(s) Oral <User Schedule>  levothyroxine 50 MICROGram(s) Oral at bedtime  multivitamin 1 Tablet(s) Oral daily  saccharomyces boulardii 250 milliGRAM(s) Oral two times a day  spironolactone 12.5 milliGRAM(s) Oral <User Schedule>    MEDICATIONS  (PRN):  acetaminophen     Tablet .. 650 milliGRAM(s) Oral every 6 hours PRN Temp greater or equal to 38C (100.4F), Mild Pain (1 - 3)  aluminum hydroxide/magnesium hydroxide/simethicone Suspension 30 milliLiter(s) Oral every 4 hours PRN Dyspepsia  ketorolac   Injectable 15 milliGRAM(s) IV Push every 8 hours PRN Moderate Pain (4 - 6)  melatonin 3 milliGRAM(s) Oral at bedtime PRN Insomnia  ondansetron Injectable 4 milliGRAM(s) IV Push every 8 hours PRN Nausea and/or Vomiting  traMADol 50 milliGRAM(s) Oral every 6 hours PRN Severe Pain (7 - 10)

## 2025-03-15 NOTE — DIETITIAN INITIAL EVALUATION ADULT - ORAL INTAKE PTA/DIET HISTORY
Pt reports following a predominantly vegan diet PTA- will occasionally eat eggs or dairy, never eats meat/fish. Pt states that she makes an effort to fortify her diet in order to meet protein requirements, although admits she can be a light eater at times. Supplements with vegan protein powders, vegan "jerky sticks", evolve protein drink. Micronutrients PTA: MVI, Vitamin B12, thiamine, folic acid, Vitamin D3 +Calcium. Vitamin C 500mg BID.

## 2025-03-15 NOTE — DIETITIAN INITIAL EVALUATION ADULT - ETIOLOGY
related to inadequate protein-energy intake in setting of chronic hypocaloric intake with vegan diet

## 2025-03-15 NOTE — DIETITIAN INITIAL EVALUATION ADULT - ADD RECOMMEND
1. Regular, lacto-ovo vegetarian + Radha Granado Standard 1.0 qd  2. Continue MVI po daily- add Vitamin C 500mg BID

## 2025-03-15 NOTE — DIETITIAN NUTRITION RISK NOTIFICATION - TREATMENT: THE FOLLOWING DIET HAS BEEN RECOMMENDED
Diet, Regular:   Lacto-Ovo Veg (Accepts Milk Prod., Eggs)  Supplement Feeding Modality:  Oral  Ensure Plant-Based Cans or Servings Per Day:  1       Frequency:  Daily (03-15-25 @ 09:50) [Active]

## 2025-03-15 NOTE — DIETITIAN INITIAL EVALUATION ADULT - NS FNS DIET ORDER
Diet, Regular:   Lacto-Ovo Veg (Accepts Milk Prod., Eggs)  Supplement Feeding Modality:  Oral  Ensure Plant-Based Cans or Servings Per Day:  1       Frequency:  Daily (03-15-25 @ 09:50)

## 2025-03-15 NOTE — DIETITIAN INITIAL EVALUATION ADULT - OTHER INFO
64 yo F with history of hepatic nodular hyperplasia presents for one day history of R lower arm pain, erythema and swelling.  Pt woke up with Right lower arm pain. Then redness and swelling of the arm worsened rapidly with associated fever and chills which prompted pt to come to the ED.  Pt tolerating diet with report of good appetite- will adjust diet for vegan/vegetarian food preferences + pt receptive to Averail vegan oral nutrition supplements. Reports UBW 125lbs- denies any recent weight change, pt lean at baseline. NFPE with evidence of severe muscle wasting/fat loss. +1 edema right arm. Cellulitis right arm. Pt denies N/V/D, constipation. Recommend change diet to Regular, lacto-ovo vegetarian + Averail standard 1.0 qd. Continue MVI, add Vitamin C 500mg BID (pt's baseline regimen).

## 2025-03-16 LAB
BUN SERPL-MCNC: 6 MG/DL — LOW (ref 7–23)
CALCIUM SERPL-MCNC: 8 MG/DL — LOW (ref 8.4–10.5)
CHLORIDE SERPL-SCNC: 102 MMOL/L — SIGNIFICANT CHANGE UP (ref 96–108)
CO2 SERPL-SCNC: 24 MMOL/L — SIGNIFICANT CHANGE UP (ref 22–31)
CREAT SERPL-MCNC: 0.38 MG/DL — LOW (ref 0.5–1.3)
EGFR: 112 ML/MIN/1.73M2 — SIGNIFICANT CHANGE UP
EGFR: 112 ML/MIN/1.73M2 — SIGNIFICANT CHANGE UP
GLUCOSE SERPL-MCNC: 102 MG/DL — HIGH (ref 70–99)
HCT VFR BLD CALC: 33 % — LOW (ref 34.5–45)
HGB BLD-MCNC: 10.8 G/DL — LOW (ref 11.5–15.5)
MCHC RBC-ENTMCNC: 30.8 PG — SIGNIFICANT CHANGE UP (ref 27–34)
MCHC RBC-ENTMCNC: 32.7 G/DL — SIGNIFICANT CHANGE UP (ref 32–36)
MCV RBC AUTO: 94 FL — SIGNIFICANT CHANGE UP (ref 80–100)
NRBC BLD AUTO-RTO: 0 /100 WBCS — SIGNIFICANT CHANGE UP (ref 0–0)
PLATELET # BLD AUTO: 157 K/UL — SIGNIFICANT CHANGE UP (ref 150–400)
POTASSIUM SERPL-MCNC: 3.7 MMOL/L — SIGNIFICANT CHANGE UP (ref 3.5–5.3)
POTASSIUM SERPL-SCNC: 3.7 MMOL/L — SIGNIFICANT CHANGE UP (ref 3.5–5.3)
RBC # BLD: 3.51 M/UL — LOW (ref 3.8–5.2)
RBC # FLD: 12.2 % — SIGNIFICANT CHANGE UP (ref 10.3–14.5)
SODIUM SERPL-SCNC: 133 MMOL/L — LOW (ref 135–145)
WBC # BLD: 5.99 K/UL — SIGNIFICANT CHANGE UP (ref 3.8–10.5)
WBC # FLD AUTO: 5.99 K/UL — SIGNIFICANT CHANGE UP (ref 3.8–10.5)

## 2025-03-16 PROCEDURE — 93971 EXTREMITY STUDY: CPT | Mod: 26,RT

## 2025-03-16 PROCEDURE — 99233 SBSQ HOSP IP/OBS HIGH 50: CPT

## 2025-03-16 RX ORDER — VANCOMYCIN HCL IN 5 % DEXTROSE 1.5G/250ML
750 PLASTIC BAG, INJECTION (ML) INTRAVENOUS EVERY 12 HOURS
Refills: 0 | Status: DISCONTINUED | OUTPATIENT
Start: 2025-03-16 | End: 2025-03-18

## 2025-03-16 RX ORDER — VANCOMYCIN HCL IN 5 % DEXTROSE 1.5G/250ML
PLASTIC BAG, INJECTION (ML) INTRAVENOUS
Refills: 0 | Status: DISCONTINUED | OUTPATIENT
Start: 2025-03-16 | End: 2025-03-18

## 2025-03-16 RX ORDER — VANCOMYCIN HCL IN 5 % DEXTROSE 1.5G/250ML
750 PLASTIC BAG, INJECTION (ML) INTRAVENOUS ONCE
Refills: 0 | Status: COMPLETED | OUTPATIENT
Start: 2025-03-16 | End: 2025-03-16

## 2025-03-16 RX ORDER — FUROSEMIDE 10 MG/ML
10 INJECTION INTRAMUSCULAR; INTRAVENOUS
Refills: 0 | Status: DISCONTINUED | OUTPATIENT
Start: 2025-03-16 | End: 2025-03-18

## 2025-03-16 RX ADMIN — Medication 1 DROP(S): at 18:54

## 2025-03-16 RX ADMIN — Medication 1 TABLET(S): at 12:10

## 2025-03-16 RX ADMIN — KETOROLAC TROMETHAMINE 15 MILLIGRAM(S): 30 INJECTION, SOLUTION INTRAMUSCULAR; INTRAVENOUS at 21:26

## 2025-03-16 RX ADMIN — BUTYROSPERMUM PARKII(SHEA BUTTER), SIMMONDSIA CHINENSIS (JOJOBA) SEED OIL, ALOE BARBADENSIS LEAF EXTRACT 250 MILLIGRAM(S): .01; 1; 3.5 LIQUID TOPICAL at 05:06

## 2025-03-16 RX ADMIN — KETOROLAC TROMETHAMINE 15 MILLIGRAM(S): 30 INJECTION, SOLUTION INTRAMUSCULAR; INTRAVENOUS at 21:58

## 2025-03-16 RX ADMIN — Medication 1 TABLET(S): at 05:06

## 2025-03-16 RX ADMIN — BUTYROSPERMUM PARKII(SHEA BUTTER), SIMMONDSIA CHINENSIS (JOJOBA) SEED OIL, ALOE BARBADENSIS LEAF EXTRACT 250 MILLIGRAM(S): .01; 1; 3.5 LIQUID TOPICAL at 18:52

## 2025-03-16 RX ADMIN — Medication 50 MICROGRAM(S): at 21:26

## 2025-03-16 RX ADMIN — LACTULOSE 20 GRAM(S): 10 SOLUTION ORAL at 05:06

## 2025-03-16 RX ADMIN — Medication 250 MILLIGRAM(S): at 13:53

## 2025-03-16 RX ADMIN — Medication 1 TABLET(S): at 18:52

## 2025-03-16 RX ADMIN — Medication 2 TABLET(S): at 21:26

## 2025-03-16 RX ADMIN — Medication 100 MILLIGRAM(S): at 05:06

## 2025-03-16 RX ADMIN — Medication 500 MILLIGRAM(S): at 18:52

## 2025-03-16 RX ADMIN — LACTULOSE 40 GRAM(S): 10 SOLUTION ORAL at 18:52

## 2025-03-16 RX ADMIN — Medication 1 DROP(S): at 05:07

## 2025-03-16 RX ADMIN — FOLIC ACID 1 MILLIGRAM(S): 1 TABLET ORAL at 13:53

## 2025-03-16 RX ADMIN — CYANOCOBALAMIN 1000 MICROGRAM(S): 1000 INJECTION INTRAMUSCULAR; SUBCUTANEOUS at 13:54

## 2025-03-16 NOTE — DISCHARGE NOTE PROVIDER - DETAILS OF MALNUTRITION DIAGNOSIS/DIAGNOSES
This patient has been assessed with a concern for Malnutrition and was treated during this hospitalization for the following Nutrition diagnosis/diagnoses:     -  03/15/2025: Severe protein-calorie malnutrition   -  03/15/2025: Underweight (BMI < 19)

## 2025-03-16 NOTE — DISCHARGE NOTE PROVIDER - HOSPITAL COURSE
Hospital Course  64 yo F with history of hepatic nodular hyperplasia presents for one day history of R lower arm pain, erythema and swelling. Pt reports that her mother has a leg wound with purulent drainage and patient has been helping with wound care. This morning, pt woke up with Right lower arm pain. Then redness and swelling of the arm worsened rapidly with associated fever and chills which prompted pt to come to the ED.  In ED, pt was febrile to 100.8, WBC 15. found with R arm cellulitis. s/p IV clindamycin x1 and IVF bolus.    Patient admitted to the hospital with redness and swelling fo the right upper extremity, associated with fever and chills.  Admitted with sepsis secondary to right lower arm cellulitis.  Patient received dose of vanco in ED, ID saw the patient, infection most likely c/w beta strep.  ID recommended coverage with ancef.  Pt with minimal improvement in next 24 hours, transitioned back to vanco.      Source of Infection:  Antibiotic / Last Day:    Palliative Care / Advanced Care Planning  Code Status:  Patient/Family agreeable to Hospice/Palliative (Y/N)?  Summary of Goals of Care Conversation:    Discharging Provider:  Lui Wylei NP  Contact Info: Cell [phone] - Please call with any questions or concerns.    Outpatient Provider:            Hospital Course  66 yo F with history of hepatic nodular hyperplasia presents for one day history of R lower arm pain, erythema and swelling. Pt reports that her mother has a leg wound with purulent drainage and patient has been helping with wound care. This morning, pt woke up with Right lower arm pain. Then redness and swelling of the arm worsened rapidly with associated fever and chills which prompted pt to come to the ED.  In ED, pt was febrile to 100.8, WBC 15. found with R arm cellulitis. s/p IV clindamycin x1 and IVF bolus.    Patient admitted to the hospital with redness and swelling fo the right upper extremity, associated with fever and chills.  Admitted with sepsis secondary to right lower arm cellulitis.  Patient received dose of vanco in ED, ID saw the patient, infection most likely c/w beta strep.  ID recommended coverage with ancef.  Pt with minimal improvement in next 24 hours, transitioned back to vanco, noted to have improvement in erythema and swelling. Underwent CT forearm, negative for fluid collection or abscess. Deemed stable for discharge home w/ PO abx       Source of Infection: Cellulitis  Antibiotic / Last Day: Minocycline 100mg BID + Cefadroxil 500mg BID x 10 days    Discharging Provider: Tyler Rocha MD  Contact Info: Cell [phone]- Please call with any questions or concerns.    Outpatient Provider: Dr. Yi Garcia

## 2025-03-16 NOTE — DISCHARGE NOTE PROVIDER - CARE PROVIDERS DIRECT ADDRESSES
[email or web address]

## 2025-03-16 NOTE — DISCHARGE NOTE PROVIDER - CARE PROVIDER_API CALL
ASH MOLINA  [address]  [place] [address]  Phone: [phone]  Fax: [phone]  Established Patient  Follow Up Time: 1-3 days    Akira Mary  Infectious Disease  [address]  [place] [address]  Phone: [phone]  Fax: [phone]  Established Patient  Follow Up Time: 1 week

## 2025-03-16 NOTE — DISCHARGE NOTE PROVIDER - NSDCCPCAREPLAN_GEN_ALL_CORE_FT
PRINCIPAL DISCHARGE DIAGNOSIS  Diagnosis: Cellulitis of arm  Assessment and Plan of Treatment: You presented with swelling and redness of your right forearm, extending up your elbow associated w/ pain. You were started on IV abx and also underwent cat scan of the arm which was negative for any acute collection, abscess or issues. It showed changes consistent w/ acute infection. You were also followed by ID specialist an responded well to abx. You are stable to go home and complete abx course w/ 10 days of oral meds

## 2025-03-16 NOTE — DISCHARGE NOTE PROVIDER - NSDCMRMEDTOKEN_GEN_ALL_CORE_FT
calcium (as carbonate)-vitamin D 500 mg-600 intl units (15 mcg) oral tablet: 1 tab(s) orally 2 times a day  folic acid: 1 milligram(s) orally once a day  lactulose 10 g/15 mL oral syrup: 30 milliliter(s) orally 2 times a day Pt takes 30ml in AM and then 60ml in PM.  Lasix 20 mg oral tablet: 0.5 tab(s) orally once a day pt takes it three times a week (Mon to Wed)  MethylCobalamin: 1,000 microgram(s) orally once a day  multivitamin: 1 tab(s) orally once a day  Prolia 60 mg/mL subcutaneous solution: 60 milligram(s) subcutaneously every 6 months  spironolactone 25 mg oral tablet: 0.5 tab(s) orally once a day takes 12.5mg daily for 3 days a week (Mon to Wed)   calcium (as carbonate)-vitamin D 500 mg-600 intl units (15 mcg) oral tablet: 1 tab(s) orally 2 times a day  cefadroxil 500 mg oral capsule: 1 cap(s) orally 2 times a day  folic acid: 1 milligram(s) orally once a day  lactulose 10 g/15 mL oral syrup: 30 milliliter(s) orally 2 times a day Pt takes 30ml in AM and then 60ml in PM.  Lasix 20 mg oral tablet: 0.5 tab(s) orally once a day pt takes it three times a week (Mon to Wed)  levothyroxine 50 mcg (0.05 mg) oral tablet: 1 tab(s) orally once a day (at bedtime)  MethylCobalamin: 1,000 microgram(s) orally once a day  minocycline 100 mg oral tablet: 1 tab(s) orally 2 times a day  multivitamin: 1 tab(s) orally once a day  Prolia 60 mg/mL subcutaneous solution: 60 milligram(s) subcutaneously every 6 months  spironolactone 25 mg oral tablet: 0.5 tab(s) orally once a day takes 12.5mg daily for 3 days a week (Mon to Wed)

## 2025-03-16 NOTE — DISCHARGE NOTE PROVIDER - PROVIDER TOKENS
PROVIDER:[TOKEN:[[id number]],FOLLOWUP:[1-3 days],ESTABLISHEDPATIENT:[T]],PROVIDER:[TOKEN:[[id number]],FOLLOWUP:[1 week],ESTABLISHEDPATIENT:[T]]

## 2025-03-16 NOTE — PROGRESS NOTE ADULT - NS ATTEND AMEND GEN_ALL_CORE FT
Pt seen and examined at bedside in the presence of her mother who is also a pt in the hospital at the moment.  No acute events overnight  Pt denies cp, palpitations, sob, abd pain, N/V, fever, chills    Pt admitted w/ RUE Cellulitis/swelling and pain. Minimal improvement  No neurovascular deficits  ID recommendations noted  Continue Cefazolin  Monitor for improvement    Pt states she has hx of Hypothyroidism  Started home Levothyroxine 50mcg    Anticipate discharge home in 48hrs
Pt seen and examined at bedside in the presence of her mother who is also a pt in the hospital at the moment.  Overnight w/ pain and concern for increase/trending upward streaks on arm   Pt denies cp, palpitations, sob, abd pain, N/V, fever, chills    Pt admitted w/ RUE Cellulitis/swelling and pain. On my exam swelling appears the same in arm, however, there is more movement in the hand and less swelling. Cannot appreciate worsening erythema at proximal end. Demarcated overnight  No neurovascular deficits  Will discontinue Cefazolin and start Vancomycin   Monitor for improvement    Anticipate discharge home in 48hrs

## 2025-03-17 LAB
ANION GAP SERPL CALC-SCNC: 8 MMOL/L — SIGNIFICANT CHANGE UP (ref 5–17)
BUN SERPL-MCNC: 5 MG/DL — LOW (ref 7–23)
CALCIUM SERPL-MCNC: 8.5 MG/DL — SIGNIFICANT CHANGE UP (ref 8.4–10.5)
CHLORIDE SERPL-SCNC: 101 MMOL/L — SIGNIFICANT CHANGE UP (ref 96–108)
CO2 SERPL-SCNC: 25 MMOL/L — SIGNIFICANT CHANGE UP (ref 22–31)
CREAT SERPL-MCNC: 0.52 MG/DL — SIGNIFICANT CHANGE UP (ref 0.5–1.3)
EGFR: 104 ML/MIN/1.73M2 — SIGNIFICANT CHANGE UP
EGFR: 104 ML/MIN/1.73M2 — SIGNIFICANT CHANGE UP
HCT VFR BLD CALC: 32.5 % — LOW (ref 34.5–45)
HGB BLD-MCNC: 10.7 G/DL — LOW (ref 11.5–15.5)
MCHC RBC-ENTMCNC: 31 PG — SIGNIFICANT CHANGE UP (ref 27–34)
MCHC RBC-ENTMCNC: 32.9 G/DL — SIGNIFICANT CHANGE UP (ref 32–36)
MCV RBC AUTO: 94.2 FL — SIGNIFICANT CHANGE UP (ref 80–100)
NRBC BLD AUTO-RTO: 0 /100 WBCS — SIGNIFICANT CHANGE UP (ref 0–0)
PLATELET # BLD AUTO: 166 K/UL — SIGNIFICANT CHANGE UP (ref 150–400)
POTASSIUM SERPL-MCNC: 3.8 MMOL/L — SIGNIFICANT CHANGE UP (ref 3.5–5.3)
POTASSIUM SERPL-SCNC: 3.8 MMOL/L — SIGNIFICANT CHANGE UP (ref 3.5–5.3)
RBC # BLD: 3.45 M/UL — LOW (ref 3.8–5.2)
RBC # FLD: 11.9 % — SIGNIFICANT CHANGE UP (ref 10.3–14.5)
SODIUM SERPL-SCNC: 134 MMOL/L — LOW (ref 135–145)
VANCOMYCIN TROUGH SERPL-MCNC: 5.8 UG/ML — LOW (ref 10–20)
WBC # BLD: 3.76 K/UL — LOW (ref 3.8–10.5)
WBC # FLD AUTO: 3.76 K/UL — LOW (ref 3.8–10.5)

## 2025-03-17 PROCEDURE — 73201 CT UPPER EXTREMITY W/DYE: CPT | Mod: 26,RT

## 2025-03-17 PROCEDURE — 99232 SBSQ HOSP IP/OBS MODERATE 35: CPT

## 2025-03-17 RX ADMIN — Medication 250 MILLIGRAM(S): at 02:16

## 2025-03-17 RX ADMIN — Medication 1 DROP(S): at 05:06

## 2025-03-17 RX ADMIN — Medication 250 MILLIGRAM(S): at 22:52

## 2025-03-17 RX ADMIN — BUTYROSPERMUM PARKII(SHEA BUTTER), SIMMONDSIA CHINENSIS (JOJOBA) SEED OIL, ALOE BARBADENSIS LEAF EXTRACT 250 MILLIGRAM(S): .01; 1; 3.5 LIQUID TOPICAL at 17:33

## 2025-03-17 RX ADMIN — FOLIC ACID 1 MILLIGRAM(S): 1 TABLET ORAL at 11:36

## 2025-03-17 RX ADMIN — Medication 12.5 MILLIGRAM(S): at 05:06

## 2025-03-17 RX ADMIN — LACTULOSE 40 GRAM(S): 10 SOLUTION ORAL at 17:33

## 2025-03-17 RX ADMIN — Medication 250 MILLIGRAM(S): at 14:26

## 2025-03-17 RX ADMIN — Medication 3 MILLIGRAM(S): at 22:21

## 2025-03-17 RX ADMIN — CYANOCOBALAMIN 1000 MICROGRAM(S): 1000 INJECTION INTRAMUSCULAR; SUBCUTANEOUS at 11:36

## 2025-03-17 RX ADMIN — Medication 1 TABLET(S): at 17:33

## 2025-03-17 RX ADMIN — Medication 1 DROP(S): at 17:34

## 2025-03-17 RX ADMIN — BUTYROSPERMUM PARKII(SHEA BUTTER), SIMMONDSIA CHINENSIS (JOJOBA) SEED OIL, ALOE BARBADENSIS LEAF EXTRACT 250 MILLIGRAM(S): .01; 1; 3.5 LIQUID TOPICAL at 05:06

## 2025-03-17 RX ADMIN — Medication 50 MICROGRAM(S): at 22:16

## 2025-03-17 RX ADMIN — Medication 500 MILLIGRAM(S): at 05:06

## 2025-03-17 RX ADMIN — Medication 1 TABLET(S): at 11:36

## 2025-03-17 RX ADMIN — LACTULOSE 20 GRAM(S): 10 SOLUTION ORAL at 05:05

## 2025-03-17 RX ADMIN — Medication 2 TABLET(S): at 22:15

## 2025-03-17 RX ADMIN — Medication 500 MILLIGRAM(S): at 17:33

## 2025-03-17 RX ADMIN — FUROSEMIDE 10 MILLIGRAM(S): 10 INJECTION INTRAMUSCULAR; INTRAVENOUS at 16:32

## 2025-03-17 RX ADMIN — Medication 1 TABLET(S): at 05:06

## 2025-03-18 ENCOUNTER — TRANSCRIPTION ENCOUNTER (OUTPATIENT)
Age: 65
End: 2025-03-18

## 2025-03-18 VITALS
OXYGEN SATURATION: 97 % | HEART RATE: 54 BPM | TEMPERATURE: 98 F | RESPIRATION RATE: 18 BRPM | DIASTOLIC BLOOD PRESSURE: 64 MMHG | SYSTOLIC BLOOD PRESSURE: 149 MMHG

## 2025-03-18 PROCEDURE — 99239 HOSP IP/OBS DSCHRG MGMT >30: CPT

## 2025-03-18 PROCEDURE — 85610 PROTHROMBIN TIME: CPT

## 2025-03-18 PROCEDURE — 73201 CT UPPER EXTREMITY W/DYE: CPT | Mod: MC

## 2025-03-18 PROCEDURE — 85027 COMPLETE CBC AUTOMATED: CPT

## 2025-03-18 PROCEDURE — 80048 BASIC METABOLIC PNL TOTAL CA: CPT

## 2025-03-18 PROCEDURE — 85730 THROMBOPLASTIN TIME PARTIAL: CPT

## 2025-03-18 PROCEDURE — 81003 URINALYSIS AUTO W/O SCOPE: CPT

## 2025-03-18 PROCEDURE — 96376 TX/PRO/DX INJ SAME DRUG ADON: CPT

## 2025-03-18 PROCEDURE — 96374 THER/PROPH/DIAG INJ IV PUSH: CPT

## 2025-03-18 PROCEDURE — 80053 COMPREHEN METABOLIC PANEL: CPT

## 2025-03-18 PROCEDURE — 87040 BLOOD CULTURE FOR BACTERIA: CPT

## 2025-03-18 PROCEDURE — 93005 ELECTROCARDIOGRAM TRACING: CPT

## 2025-03-18 PROCEDURE — 80202 ASSAY OF VANCOMYCIN: CPT

## 2025-03-18 PROCEDURE — 73090 X-RAY EXAM OF FOREARM: CPT

## 2025-03-18 PROCEDURE — 83605 ASSAY OF LACTIC ACID: CPT

## 2025-03-18 PROCEDURE — 93971 EXTREMITY STUDY: CPT

## 2025-03-18 PROCEDURE — 36415 COLL VENOUS BLD VENIPUNCTURE: CPT

## 2025-03-18 PROCEDURE — 85025 COMPLETE CBC W/AUTO DIFF WBC: CPT

## 2025-03-18 PROCEDURE — 99285 EMERGENCY DEPT VISIT HI MDM: CPT | Mod: 25

## 2025-03-18 PROCEDURE — 71045 X-RAY EXAM CHEST 1 VIEW: CPT

## 2025-03-18 RX ORDER — SPIRONOLACTONE 25 MG
0.5 TABLET ORAL
Refills: 0 | DISCHARGE

## 2025-03-18 RX ORDER — DENOSUMAB 60 MG/ML
60 INJECTION SUBCUTANEOUS
Refills: 0 | DISCHARGE

## 2025-03-18 RX ORDER — B1/B2/B3/B5/B6/B12/VIT C/FOLIC 500-0.5 MG
1 TABLET ORAL
Refills: 0 | DISCHARGE

## 2025-03-18 RX ORDER — LEVOTHYROXINE SODIUM 300 MCG
1 TABLET ORAL
Qty: 0 | Refills: 0 | DISCHARGE
Start: 2025-03-18

## 2025-03-18 RX ORDER — CALCIUM CARBONATE/VITAMIN D3 500MG-5MCG
1 TABLET ORAL
Refills: 0 | DISCHARGE

## 2025-03-18 RX ORDER — CEFADROXIL 500 MG/1
1 CAPSULE ORAL
Qty: 20 | Refills: 0
Start: 2025-03-18 | End: 2025-03-27

## 2025-03-18 RX ORDER — MINOCYCLINE HCL 50 MG
1 TABLET ORAL
Qty: 20 | Refills: 0
Start: 2025-03-18 | End: 2025-03-27

## 2025-03-18 RX ORDER — FUROSEMIDE 10 MG/ML
0.5 INJECTION INTRAMUSCULAR; INTRAVENOUS
Refills: 0 | DISCHARGE

## 2025-03-18 RX ORDER — LACTULOSE 10 G/15ML
30 SOLUTION ORAL
Refills: 0 | DISCHARGE

## 2025-03-18 RX ORDER — CHOLECALCIFEROL (VITAMIN D3) 125 MCG
1000 CAPSULE ORAL
Refills: 0 | DISCHARGE

## 2025-03-18 RX ORDER — FOLIC ACID 1 MG/1
1 TABLET ORAL
Refills: 0 | DISCHARGE

## 2025-03-18 RX ADMIN — Medication 500 MILLIGRAM(S): at 05:50

## 2025-03-18 RX ADMIN — LACTULOSE 20 GRAM(S): 10 SOLUTION ORAL at 05:50

## 2025-03-18 RX ADMIN — Medication 1 TABLET(S): at 05:50

## 2025-03-18 RX ADMIN — Medication 12.5 MILLIGRAM(S): at 05:51

## 2025-03-18 RX ADMIN — Medication 1 DROP(S): at 05:53

## 2025-03-18 RX ADMIN — BUTYROSPERMUM PARKII(SHEA BUTTER), SIMMONDSIA CHINENSIS (JOJOBA) SEED OIL, ALOE BARBADENSIS LEAF EXTRACT 250 MILLIGRAM(S): .01; 1; 3.5 LIQUID TOPICAL at 05:50

## 2025-03-18 NOTE — PROGRESS NOTE ADULT - ASSESSMENT
66 yo F with history of hepatic nodular hyperplasia presents for one day history of R lower arm pain, erythema and swelling.  Pt woke up with Right lower arm pain. Then redness and swelling of the arm worsened rapidly with associated fever and chills which prompted pt to come to the ED.    Sepsis POA (fever and leukocytosis) due to right lower arm cellulitis   -S/p improvement in cellulitis  -S/p CT Forearm negative for acute abscess   -Discharge home today w/ Transition to PO abx x 10 days of Minocycline + Cefadroxil    Hepatic nodular hyperplasia  - takes lactulose 30ml in AM, 60mg in PM, lasix 10mg daily (Mon to Wed), Spironolactone 12.5mg (Mon to Wed)  - c/w home supplement: vit B1, vit B12, folic acid, multivitamin, Sha- vit D    Hypothyroidism  - levothyroxine 50
64 yo F with history of hepatic nodular hyperplasia presents for one day history of R lower arm pain, erythema and swelling.  Pt woke up with Right lower arm pain. Then redness and swelling of the arm worsened rapidly with associated fever and chills which prompted pt to come to the ED.      Sepsis POA (fever and leukocytosis) due to right lower arm cellulitis   - status post sepsis focused exam in ED, received dose of Vacno  - ID consult appreciated, most c/w beta strep, treat with cefazolin  - f/u blood cx, elevate arm  - probiotics    Hepatic nodular hyperplasia  - takes lactulose 30ml in AM, 60mg in PM, lasix 10mg daily (Mon to Wed), Spironolactone 12.5mg (Mon to Wed)  - c/w home supplement: vit B1, vit B12, folic acid, multivitamin, Sha- vit D    DVT ppx: lovenox subq 
64 yo F with history of hepatic nodular hyperplasia presents for one day history of R lower arm pain, erythema and swelling.  Pt woke up with Right lower arm pain. Then redness and swelling of the arm worsened rapidly with associated fever and chills which prompted pt to come to the ED.    Sepsis POA (fever and leukocytosis) due to right lower arm cellulitis   -S/p improvement in cellulitis  -S/p CT Forearm negative for acute abscess   -Discussed case w/ ID: Transition to PO abx tomorrow x 10 days of Minocycline + Cefadroxil    Hepatic nodular hyperplasia  - takes lactulose 30ml in AM, 60mg in PM, lasix 10mg daily (Mon to Wed), Spironolactone 12.5mg (Mon to Wed)  - c/w home supplement: vit B1, vit B12, folic acid, multivitamin, Sha- vit D    Hypothyroidism  - levothyroxine 50
66 yo F with history of hepatic nodular hyperplasia presents for one day history of R lower arm pain, erythema and swelling.  Pt woke up with Right lower arm pain. Then redness and swelling of the arm worsened rapidly with associated fever and chills which prompted pt to come to the ED.      Sepsis POA (fever and leukocytosis) due to right lower arm cellulitis   - status post sepsis focused exam in ED, received dose of Vanco  - ID consult appreciated, most c/w beta strep, treat with cefazolin  - Minimal improvement in last 24 hours, added vanco, follow up RT UE Xray, Dopplers  - f/u blood cx, elevate arm  - probiotics    Hepatic nodular hyperplasia  - takes lactulose 30ml in AM, 60mg in PM, lasix 10mg daily (Mon to Wed), Spironolactone 12.5mg (Mon to Wed)  - c/w home supplement: vit B1, vit B12, folic acid, multivitamin, Sha- vit D    Hypothyroidism  - Started levothyroxine 50    DVT ppx: lovenox subq

## 2025-03-18 NOTE — DISCHARGE NOTE NURSING/CASE MANAGEMENT/SOCIAL WORK - NSDCPEFALRISK_GEN_ALL_CORE
For information on Fall & Injury Prevention, visit: [email or web address] OR  [email or web address] OR  https://www.cdc.gov/steadi/patient.html

## 2025-03-18 NOTE — DISCHARGE NOTE NURSING/CASE MANAGEMENT/SOCIAL WORK - PATIENT PORTAL LINK FT
You can access the FollowMyHealth Patient Portal offered by [facility] by registering at the following website: [email or web address]. By joining [employer]’s FollowMyHealth portal, you will also be able to view your health information using other applications (apps) compatible with our system.

## 2025-03-18 NOTE — PROGRESS NOTE ADULT - SUBJECTIVE AND OBJECTIVE BOX
Patient is a 65y old  Female who presents with a chief complaint of Other specified disorders of liver    Patient seen and examined at bedside.  reports minimal improvement in RUE    ALLERGIES:  penicillin (Unknown)        Vital Signs Last 24 Hrs  T(F): 98.3 (16 Mar 2025 05:37), Max: 98.8 (15 Mar 2025 19:30)  HR: 60 (16 Mar 2025 05:37) (60 - 75)  BP: 119/75 (16 Mar 2025 05:37) (119/75 - 145/67)  RR: 16 (16 Mar 2025 05:37) (16 - 18)  SpO2: 97% (16 Mar 2025 05:37) (97% - 100%)  I&O's Summary    MEDICATIONS:  acetaminophen     Tablet .. 650 milliGRAM(s) Oral every 6 hours PRN  aluminum hydroxide/magnesium hydroxide/simethicone Suspension 30 milliLiter(s) Oral every 4 hours PRN  artificial  tears Solution 1 Drop(s) Both EYES two times a day  ascorbic acid 500 milliGRAM(s) Oral two times a day  calcium carbonate 1250 mG  + Vitamin D (OsCal 500 + D) 1 Tablet(s) Oral two times a day  ceFAZolin   IVPB 2000 milliGRAM(s) IV Intermittent every 8 hours  cyanocobalamin 1000 MICROGram(s) Oral daily  folic acid 1 milliGRAM(s) Oral daily  furosemide    Tablet 10 milliGRAM(s) Oral <User Schedule>  ketorolac   Injectable 15 milliGRAM(s) IV Push every 8 hours PRN  lactulose Syrup 20 Gram(s) Oral <User Schedule>  lactulose Syrup 40 Gram(s) Oral <User Schedule>  levothyroxine 50 MICROGram(s) Oral at bedtime  melatonin 3 milliGRAM(s) Oral at bedtime PRN  multivitamin 1 Tablet(s) Oral daily  ondansetron Injectable 4 milliGRAM(s) IV Push every 8 hours PRN  saccharomyces boulardii 250 milliGRAM(s) Oral two times a day  senna 2 Tablet(s) Oral at bedtime  spironolactone 12.5 milliGRAM(s) Oral <User Schedule>  traMADol 50 milliGRAM(s) Oral every 6 hours PRN  vancomycin  IVPB          PHYSICAL EXAM:  General: NAD, A/O x 3  ENT: MMM, no thrush  Neck: Supple, No JVD  Lungs: Clear to auscultation bilaterally, non labored, good air entry  Cardio: RRR, S1/S2, No murmurs  Abdomen: Soft, Nontender, Nondistended; Bowel sounds present  Extremities: No cyanosis, RUE erythema, tenderness    LABS:                        10.8   5.99  )-----------( 157      ( 16 Mar 2025 05:18 )             33.0     03-16    133  |  102  |  6   ----------------------------<  102  3.7   |  24  |  0.38    Ca    8.0      16 Mar 2025 05:18    TPro  6.5  /  Alb  3.6  /  TBili  0.4  /  DBili  x   /  AST  41  /  ALT  33  /  AlkPhos  53  03-14      PT/INR - ( 14 Mar 2025 17:00 )   PT: 10.7 sec;   INR: 0.91 ratio         PTT - ( 14 Mar 2025 17:00 )  PTT:32.0 sec  Lactate, Blood: 1.1 mmol/L (03-14 @ 17:00)                          Urinalysis Basic - ( 16 Mar 2025 05:18 )    Color: x / Appearance: x / SG: x / pH: x  Gluc: 102 mg/dL / Ketone: x  / Bili: x / Urobili: x   Blood: x / Protein: x / Nitrite: x   Leuk Esterase: x / RBC: x / WBC x   Sq Epi: x / Non Sq Epi: x / Bacteria: x        Culture - Blood (collected 14 Mar 2025 17:01)  Source: Blood Blood-Peripheral  Preliminary Report (16 Mar 2025 01:02):    No growth at 24 hours    Culture - Blood (collected 14 Mar 2025 17:00)  Source: Blood Blood-Peripheral  Preliminary Report (16 Mar 2025 01:02):    No growth at 24 hours          RADIOLOGY & ADDITIONAL TESTS:    Care Discussed with Consultants/Other Providers:   
CC: f/u for  cellulitis of the right arm  Patient reports  arm is much better  REVIEW OF SYSTEMS:  All other review of systems negative (Comprehensive ROS)    Antimicrobials Day #  :4 abx, day 2  vancomycin  IVPB      vancomycin  IVPB 750 milliGRAM(s) IV Intermittent every 12 hours    Other Medications Reviewed    T(F): 97.7 (03-17-25 @ 05:01), Max: 98 (03-16-25 @ 12:34)  HR: 64 (03-17-25 @ 05:01)  BP: 110/67 (03-17-25 @ 05:01)  RR: 18 (03-17-25 @ 05:01)  SpO2: 97% (03-17-25 @ 05:01)  Wt(kg): --    PHYSICAL EXAM:  General: alert, no acute distress  Eyes:  anicteric, no conjunctival injection, no discharge  Oropharynx: no lesions or injection 	  Neck: supple, without adenopathy  Lungs: clear to auscultation  Heart: regular rate and rhythm; no murmur, rubs or gallops  Abdomen: soft, nondistended, nontender, without mass or organomegaly  Skin: no lesions  Extremities: no clubbing, cyanosis, . right forearm redness is much improved, has an area that is a bit boggy but no crepitus, no blister, no adenopathy, full rom elbow and wrist  Neurologic: alert, oriented, moves all extremities    LAB RESULTS:                        10.7   3.76  )-----------( 166      ( 17 Mar 2025 06:22 )             32.5     03-17    134[L]  |  101  |  5[L]  ----------------------------<  105[H]  3.8   |  25  |  0.52    Ca    8.5      17 Mar 2025 06:22        Urinalysis Basic - ( 17 Mar 2025 06:22 )    Color: x / Appearance: x / SG: x / pH: x  Gluc: 105 mg/dL / Ketone: x  / Bili: x / Urobili: x   Blood: x / Protein: x / Nitrite: x   Leuk Esterase: x / RBC: x / WBC x   Sq Epi: x / Non Sq Epi: x / Bacteria: x      MICROBIOLOGY:  RECENT CULTURES:  03-14 @ 17:01 Blood Blood-Peripheral     No growth at 48 Hours      03-14 @ 17:00 Blood Blood-Peripheral     No growth at 48 Hours          RADIOLOGY REVIEWED:    < from: US Duplex Venous Upper Ext Ltd, Right (03.16.25 @ 12:06) >  ACC: [id number] EXAM:  US DPLX UPR EXT VEINS LTD RT   ORDERED BY:  ELOISE CRAVEN     PROCEDURE DATE:  03/16/2025          INTERPRETATION:  CLINICAL INFORMATION: 65-year-old with history of   cellulitis of right arm. Right upper extremity swelling with feverand   rash.    COMPARISON: None.    TECHNIQUE: Duplex sonography of the RIGHT UPPER extremity veins with   color and spectral Doppler, with and without compression.    FINDINGS:    The right internal jugular, subclavian, axillary and brachial veins are   patent and compressible where applicable.  The basilic vein (superficial   vein) is patent and without thrombus.  The cephalic vein (superficial   vein) is patent and without thrombus.    Doppler examination shows normal spontaneous and phasic flow.    Soft tissue edema is noted in the right upper extremity.    IMPRESSION:    No evidence of right upper extremity deep venous thrombosis.        < end of copied text >            Assessment:  Patient with liver disease who developed abrupt onset of redness and swelling of the right forearm starting 3 d ago, felt very achey all over as well. She was placed on cefazolin 3 d ago then changed to vanco. HEr arm is doing much better. This does not look like an mrsa cellulitis and the tempo/clinical course is most consistent with beta strept. I think the cefazolin was working because the fever and leukocytosis resolved on it but for now will continue the vanco since she is doing well. She has no cat or dog exposure and has cracks in the finger tips as likely portal of entry. I suspect this is just cellulitis but given the boggy area will do a soft tissue us of the area to be sure no collection  Plan:  continue vanco  elevate arm  check vanco trough  await soft tissue us of the forearm  if continues to do well will plan on change to po minocycline and cefadroxil tomorrow as long as no abscess
CC: f/u for cellulitis right forearm    Patient reports arm is much better    REVIEW OF SYSTEMS:  All other review of systems negative (Comprehensive ROS)    Antimicrobials Day #  :5  vancomycin  IVPB      vancomycin  IVPB 750 milliGRAM(s) IV Intermittent every 12 hours    Other Medications Reviewed    T(F): 97.5 (03-18-25 @ 05:01), Max: 97.5 (03-18-25 @ 05:01)  HR: 54 (03-18-25 @ 05:01)  BP: 149/64 (03-18-25 @ 05:01)  RR: 18 (03-18-25 @ 05:01)  SpO2: 97% (03-18-25 @ 05:01)  Wt(kg): --    PHYSICAL EXAM:  General: alert, no acute distress  Eyes:  anicteric, no conjunctival injection, no discharge  Oropharynx: no lesions or injection 	  Neck: supple, without adenopathy  Abdomen: soft, nondistended, nontender, without mass or organomegaly  Skin: no lesions  Extremities: no clubbing, cyanosis, . right forearm still some boggyness but red almost fully gone  Neurologic: alert, oriented, moves all extremities    LAB RESULTS:                        10.7   3.76  )-----------( 166      ( 17 Mar 2025 06:22 )             32.5     03-17    134[L]  |  101  |  5[L]  ----------------------------<  105[H]  3.8   |  25  |  0.52    Ca    8.5      17 Mar 2025 06:22        Urinalysis Basic - ( 17 Mar 2025 06:22 )    Color: x / Appearance: x / SG: x / pH: x  Gluc: 105 mg/dL / Ketone: x  / Bili: x / Urobili: x   Blood: x / Protein: x / Nitrite: x   Leuk Esterase: x / RBC: x / WBC x   Sq Epi: x / Non Sq Epi: x / Bacteria: x      MICROBIOLOGY:  RECENT CULTURES:  03-14 @ 17:01 Blood Blood-Peripheral     No growth at 72 Hours      03-14 @ 17:00 Blood Blood-Peripheral     No growth at 72 Hours          RADIOLOGY REVIEWED:              Assessment: patient with rue cellulitis, tempo and clinical course most c/w beta strept. now infection is controlled. Doing well on vanco after slow response to cefazolin.    Plan:  change to po cefadroxil and minocycline for 9 more days  f/u in office in 2-3 weeks, call sooner if any issues  elevate arem
Patient is a 65y old  Female who presents with a chief complaint of Cellulitis (14 Mar 2025 19:45)    Patient seen and examined at bedside.  no acute events overnight    ALLERGIES:  penicillin (Unknown)        Vital Signs Last 24 Hrs  T(F): 99.2 (15 Mar 2025 05:00), Max: 100.8 (14 Mar 2025 15:56)  HR: 78 (15 Mar 2025 05:00) (73 - 96)  BP: 139/73 (15 Mar 2025 05:00) (112/91 - 156/96)  RR: 17 (15 Mar 2025 05:00) (12 - 18)  SpO2: 96% (15 Mar 2025 05:00) (96% - 100%)  I&O's Summary    MEDICATIONS:  acetaminophen     Tablet .. 650 milliGRAM(s) Oral every 6 hours PRN  aluminum hydroxide/magnesium hydroxide/simethicone Suspension 30 milliLiter(s) Oral every 4 hours PRN  calcium carbonate 1250 mG  + Vitamin D (OsCal 500 + D) 1 Tablet(s) Oral two times a day  ceFAZolin   IVPB 2000 milliGRAM(s) IV Intermittent every 8 hours  cyanocobalamin 1000 MICROGram(s) Oral daily  enoxaparin Injectable 30 milliGRAM(s) SubCutaneous every 24 hours  folic acid 1 milliGRAM(s) Oral daily  furosemide    Tablet 10 milliGRAM(s) Oral <User Schedule>  ketorolac   Injectable 15 milliGRAM(s) IV Push every 8 hours PRN  lactulose Syrup 20 Gram(s) Oral <User Schedule>  lactulose Syrup 40 Gram(s) Oral <User Schedule>  melatonin 3 milliGRAM(s) Oral at bedtime PRN  multivitamin 1 Tablet(s) Oral daily  ondansetron Injectable 4 milliGRAM(s) IV Push every 8 hours PRN  saccharomyces boulardii 250 milliGRAM(s) Oral two times a day  spironolactone 12.5 milliGRAM(s) Oral <User Schedule>  traMADol 50 milliGRAM(s) Oral every 6 hours PRN      PHYSICAL EXAM:  General: NAD, A/O x 3  ENT: MMM, no thrush  Neck: Supple, No JVD  Lungs: Clear to auscultation bilaterally, non labored, good air entry  Cardio: RRR, S1/S2, No murmurs  Abdomen: Soft, Nontender, Nondistended; Bowel sounds present  Extremities: No cyanosis, No edema, RUE edematous +erythema +tenderness    LABS:                        10.6   9.62  )-----------( 158      ( 15 Mar 2025 05:25 )             32.6     03-15    134  |  102  |  10  ----------------------------<  101  4.0   |  25  |  0.47    Ca    8.0      15 Mar 2025 05:25    TPro  6.5  /  Alb  3.6  /  TBili  0.4  /  DBili  x   /  AST  41  /  ALT  33  /  AlkPhos  53  03-14      PT/INR - ( 14 Mar 2025 17:00 )   PT: 10.7 sec;   INR: 0.91 ratio         PTT - ( 14 Mar 2025 17:00 )  PTT:32.0 sec  Lactate, Blood: 1.1 mmol/L (03-14 @ 17:00)                          Urinalysis Basic - ( 15 Mar 2025 05:25 )    Color: x / Appearance: x / SG: x / pH: x  Gluc: 101 mg/dL / Ketone: x  / Bili: x / Urobili: x   Blood: x / Protein: x / Nitrite: x   Leuk Esterase: x / RBC: x / WBC x   Sq Epi: x / Non Sq Epi: x / Bacteria: x            RADIOLOGY & ADDITIONAL TESTS:    Care Discussed with Consultants/Other Providers:   
Patient is a 65y old  Female who presents with a chief complaint of Cellulitis (17 Mar 2025 09:52)      SUBJECTIVE / OVERNIGHT EVENTS:  Pt seen and examined at bedside. No acute events overnight.  Pt denies cp, palpitations, sob, abd pain, N/V, fever, chills.    ROS:  All other review of systems negative    Allergies    penicillin (Unknown)    Intolerances        MEDICATIONS  (STANDING):  artificial  tears Solution 1 Drop(s) Both EYES two times a day  ascorbic acid 500 milliGRAM(s) Oral two times a day  calcium carbonate 1250 mG  + Vitamin D (OsCal 500 + D) 1 Tablet(s) Oral two times a day  cyanocobalamin 1000 MICROGram(s) Oral daily  folic acid 1 milliGRAM(s) Oral daily  furosemide    Tablet 10 milliGRAM(s) Oral <User Schedule>  lactulose Syrup 20 Gram(s) Oral <User Schedule>  lactulose Syrup 40 Gram(s) Oral <User Schedule>  levothyroxine 50 MICROGram(s) Oral at bedtime  multivitamin 1 Tablet(s) Oral daily  saccharomyces boulardii 250 milliGRAM(s) Oral two times a day  senna 2 Tablet(s) Oral at bedtime  spironolactone 12.5 milliGRAM(s) Oral <User Schedule>  vancomycin  IVPB      vancomycin  IVPB 750 milliGRAM(s) IV Intermittent every 12 hours    MEDICATIONS  (PRN):  acetaminophen     Tablet .. 650 milliGRAM(s) Oral every 6 hours PRN Temp greater or equal to 38C (100.4F), Mild Pain (1 - 3)  aluminum hydroxide/magnesium hydroxide/simethicone Suspension 30 milliLiter(s) Oral every 4 hours PRN Dyspepsia  ketorolac   Injectable 15 milliGRAM(s) IV Push every 8 hours PRN Moderate Pain (4 - 6)  melatonin 3 milliGRAM(s) Oral at bedtime PRN Insomnia  ondansetron Injectable 4 milliGRAM(s) IV Push every 8 hours PRN Nausea and/or Vomiting  traMADol 50 milliGRAM(s) Oral every 6 hours PRN Severe Pain (7 - 10)      Vital Signs Last 24 Hrs  T(C): 36.4 (17 Mar 2025 11:48), Max: 36.7 (16 Mar 2025 19:00)  T(F): 97.5 (17 Mar 2025 11:48), Max: 98 (16 Mar 2025 19:00)  HR: 55 (17 Mar 2025 11:48) (55 - 64)  BP: 134/80 (17 Mar 2025 11:48) (110/67 - 166/84)  BP(mean): --  RR: 17 (17 Mar 2025 11:48) (17 - 18)  SpO2: 100% (17 Mar 2025 11:48) (97% - 100%)    Parameters below as of 17 Mar 2025 11:48  Patient On (Oxygen Delivery Method): room air      CAPILLARY BLOOD GLUCOSE        I&O's Summary    16 Mar 2025 07:01  -  17 Mar 2025 07:00  --------------------------------------------------------  IN: 250 mL / OUT: 0 mL / NET: 250 mL        PHYSICAL EXAM:  GENERAL: NAD, well-developed AAOx3 female   HEAD:  Atraumatic, Normocephalic  NECK: Supple, No JVD  CHEST/LUNG: Clear to auscultation bilaterally; No wheeze, nonlabored breathing  HEART: Regular rate and rhythm; No murmurs, rubs, or gallops  ABDOMEN: Soft, Nontender, Nondistended; Bowel sounds present  EXTREMITIES: No clubbing, cyanosis, or edema + RUE swelling and erythema significantly improved   PSYCH: calm, appropriate mood    LABS:                        10.7   3.76  )-----------( 166      ( 17 Mar 2025 06:22 )             32.5     03-17    134[L]  |  101  |  5[L]  ----------------------------<  105[H]  3.8   |  25  |  0.52    Ca    8.5      17 Mar 2025 06:22            Urinalysis Basic - ( 17 Mar 2025 06:22 )    Color: x / Appearance: x / SG: x / pH: x  Gluc: 105 mg/dL / Ketone: x  / Bili: x / Urobili: x   Blood: x / Protein: x / Nitrite: x   Leuk Esterase: x / RBC: x / WBC x   Sq Epi: x / Non Sq Epi: x / Bacteria: x        RADIOLOGY & ADDITIONAL TESTS:  Results Reviewed:   Imaging Personally Reviewed:  Electrocardiogram Personally Reviewed:    COORDINATION OF CARE:  Care Discussed with Consultants/Other Providers [Y/N]:  Prior or Outpatient Records Reviewed [Y/N]:
Patient is a 65y old  Female who presents with a chief complaint of Cellulitis (17 Mar 2025 13:09)      SUBJECTIVE / OVERNIGHT EVENTS:  Pt seen and examined at bedside in the presence of her mom. No acute events overnight.  Pt denies cp, palpitations, sob, abd pain, N/V, fever, chills.    ROS:  All other review of systems negative    Allergies    penicillin (Unknown)    Intolerances        MEDICATIONS  (STANDING):  artificial  tears Solution 1 Drop(s) Both EYES two times a day  ascorbic acid 500 milliGRAM(s) Oral two times a day  calcium carbonate 1250 mG  + Vitamin D (OsCal 500 + D) 1 Tablet(s) Oral two times a day  cyanocobalamin 1000 MICROGram(s) Oral daily  folic acid 1 milliGRAM(s) Oral daily  furosemide    Tablet 10 milliGRAM(s) Oral <User Schedule>  lactulose Syrup 20 Gram(s) Oral <User Schedule>  lactulose Syrup 40 Gram(s) Oral <User Schedule>  levothyroxine 50 MICROGram(s) Oral at bedtime  multivitamin 1 Tablet(s) Oral daily  saccharomyces boulardii 250 milliGRAM(s) Oral two times a day  senna 2 Tablet(s) Oral at bedtime  spironolactone 12.5 milliGRAM(s) Oral <User Schedule>  vancomycin  IVPB      vancomycin  IVPB 750 milliGRAM(s) IV Intermittent every 12 hours    MEDICATIONS  (PRN):  acetaminophen     Tablet .. 650 milliGRAM(s) Oral every 6 hours PRN Temp greater or equal to 38C (100.4F), Mild Pain (1 - 3)  aluminum hydroxide/magnesium hydroxide/simethicone Suspension 30 milliLiter(s) Oral every 4 hours PRN Dyspepsia  melatonin 3 milliGRAM(s) Oral at bedtime PRN Insomnia  ondansetron Injectable 4 milliGRAM(s) IV Push every 8 hours PRN Nausea and/or Vomiting  traMADol 50 milliGRAM(s) Oral every 6 hours PRN Severe Pain (7 - 10)      Vital Signs Last 24 Hrs  T(C): 36.4 (18 Mar 2025 05:01), Max: 36.4 (17 Mar 2025 11:48)  T(F): 97.5 (18 Mar 2025 05:01), Max: 97.6 (17 Mar 2025 19:13)  HR: 54 (18 Mar 2025 05:01) (54 - 56)  BP: 149/64 (18 Mar 2025 05:01) (134/80 - 149/64)  BP(mean): --  RR: 18 (18 Mar 2025 05:01) (17 - 18)  SpO2: 97% (18 Mar 2025 05:01) (97% - 100%)    Parameters below as of 18 Mar 2025 05:01  Patient On (Oxygen Delivery Method): room air      CAPILLARY BLOOD GLUCOSE        I&O's Summary      PHYSICAL EXAM:  GENERAL: NAD, well-developed AAOx3 female   HEAD:  Atraumatic, Normocephalic  NECK: Supple, No JVD  CHEST/LUNG: Clear to auscultation bilaterally; No wheeze, nonlabored breathing  HEART: Regular rate and rhythm; No murmurs, rubs, or gallops  ABDOMEN: Soft, Nontender, Nondistended; Bowel sounds present  EXTREMITIES: No clubbing, cyanosis, or edema + RUE swelling and erythema significantly improved   PSYCH: calm, appropriate mood    LABS:                        10.7   3.76  )-----------( 166      ( 17 Mar 2025 06:22 )             32.5     03-17    134[L]  |  101  |  5[L]  ----------------------------<  105[H]  3.8   |  25  |  0.52    Ca    8.5      17 Mar 2025 06:22            Urinalysis Basic - ( 17 Mar 2025 06:22 )    Color: x / Appearance: x / SG: x / pH: x  Gluc: 105 mg/dL / Ketone: x  / Bili: x / Urobili: x   Blood: x / Protein: x / Nitrite: x   Leuk Esterase: x / RBC: x / WBC x   Sq Epi: x / Non Sq Epi: x / Bacteria: x        RADIOLOGY & ADDITIONAL TESTS:  Results Reviewed:   Imaging Personally Reviewed:  Electrocardiogram Personally Reviewed:    COORDINATION OF CARE:  Care Discussed with Consultants/Other Providers [Y/N]:  Prior or Outpatient Records Reviewed [Y/N]:

## 2025-03-18 NOTE — DISCHARGE NOTE NURSING/CASE MANAGEMENT/SOCIAL WORK - FINANCIAL ASSISTANCE
[facility] provides services at a reduced cost to those who are determined to be eligible through [facility]’s financial assistance program. Information regarding [facility]’s financial assistance program can be found by going to [email or web address] or by calling [phone].

## 2025-03-18 NOTE — PROGRESS NOTE ADULT - NUTRITIONAL ASSESSMENT
This patient has been assessed with a concern for Malnutrition and has been determined to have a diagnosis/diagnoses of Severe protein-calorie malnutrition and Underweight (BMI < 19).    This patient is being managed with:   Diet Regular-  Lacto-Ovo Veg (Accepts Milk Prod. Eggs)  Supplement Feeding Modality:  Oral  Ensure Plant-Based Cans or Servings Per Day:  1       Frequency:  Daily  Entered: Mar 15 2025  9:50AM  

## 2025-03-20 LAB
CULTURE RESULTS: SIGNIFICANT CHANGE UP
CULTURE RESULTS: SIGNIFICANT CHANGE UP
SPECIMEN SOURCE: SIGNIFICANT CHANGE UP
SPECIMEN SOURCE: SIGNIFICANT CHANGE UP

## 2025-04-11 PROBLEM — K76.89 OTHER SPECIFIED DISEASES OF LIVER: Chronic | Status: ACTIVE | Noted: 2025-03-14

## 2025-04-17 ENCOUNTER — APPOINTMENT (OUTPATIENT)
Dept: ULTRASOUND IMAGING | Facility: CLINIC | Age: 65
End: 2025-04-17

## 2025-04-17 ENCOUNTER — OUTPATIENT (OUTPATIENT)
Dept: OUTPATIENT SERVICES | Facility: HOSPITAL | Age: 65
LOS: 1 days | End: 2025-04-17
Payer: MEDICAID

## 2025-04-17 DIAGNOSIS — Z98.890 OTHER SPECIFIED POSTPROCEDURAL STATES: Chronic | ICD-10-CM

## 2025-04-17 DIAGNOSIS — Z00.8 ENCOUNTER FOR OTHER GENERAL EXAMINATION: ICD-10-CM

## 2025-04-17 PROCEDURE — 93971 EXTREMITY STUDY: CPT | Mod: 26,RT

## 2025-04-17 PROCEDURE — 93971 EXTREMITY STUDY: CPT

## 2025-04-25 ENCOUNTER — INPATIENT (INPATIENT)
Facility: HOSPITAL | Age: 65
LOS: 0 days | Discharge: HOME | DRG: 313 | End: 2025-04-26
Attending: STUDENT IN AN ORGANIZED HEALTH CARE EDUCATION/TRAINING PROGRAM | Admitting: INTERNAL MEDICINE
Payer: COMMERCIAL

## 2025-04-25 VITALS
RESPIRATION RATE: 18 BRPM | HEIGHT: 70 IN | TEMPERATURE: 98 F | WEIGHT: 125 LBS | SYSTOLIC BLOOD PRESSURE: 124 MMHG | HEART RATE: 65 BPM | OXYGEN SATURATION: 100 % | DIASTOLIC BLOOD PRESSURE: 85 MMHG

## 2025-04-25 DIAGNOSIS — Z98.890 OTHER SPECIFIED POSTPROCEDURAL STATES: Chronic | ICD-10-CM

## 2025-04-25 DIAGNOSIS — R07.9 CHEST PAIN, UNSPECIFIED: ICD-10-CM

## 2025-04-25 LAB
ALBUMIN SERPL ELPH-MCNC: 3.2 G/DL — LOW (ref 3.3–5)
ALP SERPL-CCNC: 75 U/L — SIGNIFICANT CHANGE UP (ref 40–120)
ALT FLD-CCNC: 39 U/L — SIGNIFICANT CHANGE UP (ref 10–45)
ANION GAP SERPL CALC-SCNC: 5 MMOL/L — SIGNIFICANT CHANGE UP (ref 5–17)
APTT BLD: 33.7 SEC — SIGNIFICANT CHANGE UP (ref 26.1–36.8)
AST SERPL-CCNC: 36 U/L — SIGNIFICANT CHANGE UP (ref 10–40)
BASOPHILS # BLD AUTO: 0.03 K/UL — SIGNIFICANT CHANGE UP (ref 0–0.2)
BASOPHILS NFR BLD AUTO: 0.9 % — SIGNIFICANT CHANGE UP (ref 0–2)
BILIRUB SERPL-MCNC: 0.3 MG/DL — SIGNIFICANT CHANGE UP (ref 0.2–1.2)
BUN SERPL-MCNC: 6 MG/DL — LOW (ref 7–23)
CALCIUM SERPL-MCNC: 9 MG/DL — SIGNIFICANT CHANGE UP (ref 8.4–10.5)
CHLORIDE SERPL-SCNC: 100 MMOL/L — SIGNIFICANT CHANGE UP (ref 96–108)
CO2 SERPL-SCNC: 29 MMOL/L — SIGNIFICANT CHANGE UP (ref 22–31)
CREAT SERPL-MCNC: 0.48 MG/DL — LOW (ref 0.5–1.3)
CRP SERPL-MCNC: <3 MG/L — SIGNIFICANT CHANGE UP
D DIMER BLD IA.RAPID-MCNC: 220 NG/ML DDU — SIGNIFICANT CHANGE UP
EGFR: 106 ML/MIN/1.73M2 — SIGNIFICANT CHANGE UP
EGFR: 106 ML/MIN/1.73M2 — SIGNIFICANT CHANGE UP
EOSINOPHIL # BLD AUTO: 0.17 K/UL — SIGNIFICANT CHANGE UP (ref 0–0.5)
EOSINOPHIL NFR BLD AUTO: 5.4 % — SIGNIFICANT CHANGE UP (ref 0–6)
ERYTHROCYTE [SEDIMENTATION RATE] IN BLOOD: 3 MM/HR — SIGNIFICANT CHANGE UP (ref 0–20)
FLUAV AG NPH QL: SIGNIFICANT CHANGE UP
FLUBV AG NPH QL: SIGNIFICANT CHANGE UP
GLUCOSE SERPL-MCNC: 103 MG/DL — HIGH (ref 70–99)
HCT VFR BLD CALC: 34.1 % — LOW (ref 34.5–45)
HGB BLD-MCNC: 11 G/DL — LOW (ref 11.5–15.5)
IMM GRANULOCYTES NFR BLD AUTO: 0.6 % — SIGNIFICANT CHANGE UP (ref 0–0.9)
INR BLD: 0.82 RATIO — LOW (ref 0.85–1.16)
LYMPHOCYTES # BLD AUTO: 0.68 K/UL — LOW (ref 1–3.3)
LYMPHOCYTES # BLD AUTO: 21.5 % — SIGNIFICANT CHANGE UP (ref 13–44)
MAGNESIUM SERPL-MCNC: 1.9 MG/DL — SIGNIFICANT CHANGE UP (ref 1.6–2.6)
MCHC RBC-ENTMCNC: 30.8 PG — SIGNIFICANT CHANGE UP (ref 27–34)
MCHC RBC-ENTMCNC: 32.3 G/DL — SIGNIFICANT CHANGE UP (ref 32–36)
MCV RBC AUTO: 95.5 FL — SIGNIFICANT CHANGE UP (ref 80–100)
MONOCYTES # BLD AUTO: 0.44 K/UL — SIGNIFICANT CHANGE UP (ref 0–0.9)
MONOCYTES NFR BLD AUTO: 13.9 % — SIGNIFICANT CHANGE UP (ref 2–14)
NEUTROPHILS # BLD AUTO: 1.82 K/UL — SIGNIFICANT CHANGE UP (ref 1.8–7.4)
NEUTROPHILS NFR BLD AUTO: 57.7 % — SIGNIFICANT CHANGE UP (ref 43–77)
NRBC BLD AUTO-RTO: 0 /100 WBCS — SIGNIFICANT CHANGE UP (ref 0–0)
PLATELET # BLD AUTO: 229 K/UL — SIGNIFICANT CHANGE UP (ref 150–400)
POTASSIUM SERPL-MCNC: 4 MMOL/L — SIGNIFICANT CHANGE UP (ref 3.5–5.3)
POTASSIUM SERPL-SCNC: 4 MMOL/L — SIGNIFICANT CHANGE UP (ref 3.5–5.3)
PROT SERPL-MCNC: 6 G/DL — SIGNIFICANT CHANGE UP (ref 6–8.3)
PROTHROM AB SERPL-ACNC: 9.7 SEC — LOW (ref 9.9–13.4)
RBC # BLD: 3.57 M/UL — LOW (ref 3.8–5.2)
RBC # FLD: 12.5 % — SIGNIFICANT CHANGE UP (ref 10.3–14.5)
RSV RNA NPH QL NAA+NON-PROBE: SIGNIFICANT CHANGE UP
SARS-COV-2 RNA SPEC QL NAA+PROBE: SIGNIFICANT CHANGE UP
SODIUM SERPL-SCNC: 134 MMOL/L — LOW (ref 135–145)
SOURCE RESPIRATORY: SIGNIFICANT CHANGE UP
TROPONIN I, HIGH SENSITIVITY RESULT: 6 NG/L — SIGNIFICANT CHANGE UP
TROPONIN I, HIGH SENSITIVITY RESULT: <4 NG/L — SIGNIFICANT CHANGE UP
WBC # BLD: 3.16 K/UL — LOW (ref 3.8–10.5)
WBC # FLD AUTO: 3.16 K/UL — LOW (ref 3.8–10.5)

## 2025-04-25 PROCEDURE — 99233 SBSQ HOSP IP/OBS HIGH 50: CPT

## 2025-04-25 PROCEDURE — 99285 EMERGENCY DEPT VISIT HI MDM: CPT

## 2025-04-25 PROCEDURE — 71275 CT ANGIOGRAPHY CHEST: CPT | Mod: 26

## 2025-04-25 PROCEDURE — 93010 ELECTROCARDIOGRAM REPORT: CPT

## 2025-04-25 RX ORDER — SPIRONOLACTONE 25 MG
12.5 TABLET ORAL
Refills: 0 | Status: DISCONTINUED | OUTPATIENT
Start: 2025-04-25 | End: 2025-04-25

## 2025-04-25 RX ORDER — INFLUENZA A VIRUS A/IDAHO/07/2018 (H1N1) ANTIGEN (MDCK CELL DERIVED, PROPIOLACTONE INACTIVATED, INFLUENZA A VIRUS A/INDIANA/08/2018 (H3N2) ANTIGEN (MDCK CELL DERIVED, PROPIOLACTONE INACTIVATED), INFLUENZA B VIRUS B/SINGAPORE/INFTT-16-0610/2016 ANTIGEN (MDCK CELL DERIVED, PROPIOLACTONE INACTIVATED), INFLUENZA B VIRUS B/IOWA/06/2017 ANTIGEN (MDCK CELL DERIVED, PROPIOLACTONE INACTIVATED) 15; 15; 15; 15 UG/.5ML; UG/.5ML; UG/.5ML; UG/.5ML
0.5 INJECTION, SUSPENSION INTRAMUSCULAR ONCE
Refills: 0 | Status: DISCONTINUED | OUTPATIENT
Start: 2025-04-25 | End: 2025-04-26

## 2025-04-25 RX ORDER — ATORVASTATIN CALCIUM 80 MG/1
40 TABLET, FILM COATED ORAL AT BEDTIME
Refills: 0 | Status: DISCONTINUED | OUTPATIENT
Start: 2025-04-25 | End: 2025-04-26

## 2025-04-25 RX ORDER — ASPIRIN 325 MG
81 TABLET ORAL DAILY
Refills: 0 | Status: DISCONTINUED | OUTPATIENT
Start: 2025-04-25 | End: 2025-04-26

## 2025-04-25 RX ORDER — ENOXAPARIN SODIUM 100 MG/ML
30 INJECTION SUBCUTANEOUS EVERY 24 HOURS
Refills: 0 | Status: DISCONTINUED | OUTPATIENT
Start: 2025-04-25 | End: 2025-04-26

## 2025-04-25 RX ORDER — FUROSEMIDE 10 MG/ML
10 INJECTION INTRAMUSCULAR; INTRAVENOUS
Refills: 0 | Status: DISCONTINUED | OUTPATIENT
Start: 2025-04-25 | End: 2025-04-26

## 2025-04-25 RX ORDER — FOLIC ACID 1 MG/1
1 TABLET ORAL DAILY
Refills: 0 | Status: DISCONTINUED | OUTPATIENT
Start: 2025-04-25 | End: 2025-04-26

## 2025-04-25 RX ORDER — OXYCODONE HYDROCHLORIDE 30 MG/1
5 TABLET ORAL EVERY 6 HOURS
Refills: 0 | Status: DISCONTINUED | OUTPATIENT
Start: 2025-04-25 | End: 2025-04-26

## 2025-04-25 RX ORDER — LACTULOSE 10 G/15ML
40 SOLUTION ORAL AT BEDTIME
Refills: 0 | Status: DISCONTINUED | OUTPATIENT
Start: 2025-04-25 | End: 2025-04-26

## 2025-04-25 RX ORDER — LEVOTHYROXINE SODIUM 300 MCG
50 TABLET ORAL DAILY
Refills: 0 | Status: DISCONTINUED | OUTPATIENT
Start: 2025-04-25 | End: 2025-04-26

## 2025-04-25 RX ORDER — LACTULOSE 10 G/15ML
20 SOLUTION ORAL DAILY
Refills: 0 | Status: DISCONTINUED | OUTPATIENT
Start: 2025-04-25 | End: 2025-04-26

## 2025-04-25 RX ORDER — FUROSEMIDE 10 MG/ML
20 INJECTION INTRAMUSCULAR; INTRAVENOUS
Refills: 0 | Status: DISCONTINUED | OUTPATIENT
Start: 2025-04-25 | End: 2025-04-25

## 2025-04-25 RX ORDER — SPIRONOLACTONE 25 MG
12.5 TABLET ORAL
Refills: 0 | Status: DISCONTINUED | OUTPATIENT
Start: 2025-04-25 | End: 2025-04-26

## 2025-04-25 RX ORDER — ACETAMINOPHEN 500 MG/5ML
650 LIQUID (ML) ORAL ONCE
Refills: 0 | Status: COMPLETED | OUTPATIENT
Start: 2025-04-25 | End: 2025-04-25

## 2025-04-25 RX ORDER — MELATONIN 5 MG
3 TABLET ORAL AT BEDTIME
Refills: 0 | Status: DISCONTINUED | OUTPATIENT
Start: 2025-04-25 | End: 2025-04-26

## 2025-04-25 RX ORDER — B1/B2/B3/B5/B6/B12/VIT C/FOLIC 500-0.5 MG
1 TABLET ORAL DAILY
Refills: 0 | Status: DISCONTINUED | OUTPATIENT
Start: 2025-04-25 | End: 2025-04-26

## 2025-04-25 RX ADMIN — Medication 3 MILLIGRAM(S): at 22:34

## 2025-04-25 RX ADMIN — ATORVASTATIN CALCIUM 40 MILLIGRAM(S): 80 TABLET, FILM COATED ORAL at 21:34

## 2025-04-25 RX ADMIN — ENOXAPARIN SODIUM 30 MILLIGRAM(S): 100 INJECTION SUBCUTANEOUS at 21:34

## 2025-04-25 RX ADMIN — FUROSEMIDE 10 MILLIGRAM(S): 10 INJECTION INTRAMUSCULAR; INTRAVENOUS at 20:25

## 2025-04-25 RX ADMIN — Medication 1000 MILLILITER(S): at 11:15

## 2025-04-25 RX ADMIN — LACTULOSE 40 GRAM(S): 10 SOLUTION ORAL at 21:35

## 2025-04-26 ENCOUNTER — RESULT REVIEW (OUTPATIENT)
Age: 65
End: 2025-04-26

## 2025-04-26 ENCOUNTER — TRANSCRIPTION ENCOUNTER (OUTPATIENT)
Age: 65
End: 2025-04-26

## 2025-04-26 VITALS — WEIGHT: 154.98 LBS

## 2025-04-26 LAB
A1C WITH ESTIMATED AVERAGE GLUCOSE RESULT: 5.5 % — SIGNIFICANT CHANGE UP (ref 4–5.6)
ANION GAP SERPL CALC-SCNC: 6 MMOL/L — SIGNIFICANT CHANGE UP (ref 5–17)
BUN SERPL-MCNC: 4 MG/DL — LOW (ref 7–23)
CALCIUM SERPL-MCNC: 8.1 MG/DL — LOW (ref 8.4–10.5)
CHLORIDE SERPL-SCNC: 104 MMOL/L — SIGNIFICANT CHANGE UP (ref 96–108)
CHOLEST SERPL-MCNC: 150 MG/DL — SIGNIFICANT CHANGE UP
CO2 SERPL-SCNC: 27 MMOL/L — SIGNIFICANT CHANGE UP (ref 22–31)
CREAT SERPL-MCNC: 0.57 MG/DL — SIGNIFICANT CHANGE UP (ref 0.5–1.3)
EGFR: 101 ML/MIN/1.73M2 — SIGNIFICANT CHANGE UP
EGFR: 101 ML/MIN/1.73M2 — SIGNIFICANT CHANGE UP
ESTIMATED AVERAGE GLUCOSE: 111 MG/DL — SIGNIFICANT CHANGE UP (ref 68–114)
FOLATE SERPL-MCNC: >20 NG/ML — SIGNIFICANT CHANGE UP
GLUCOSE SERPL-MCNC: 102 MG/DL — HIGH (ref 70–99)
HCT VFR BLD CALC: 33.7 % — LOW (ref 34.5–45)
HDLC SERPL-MCNC: 63 MG/DL — SIGNIFICANT CHANGE UP
HGB BLD-MCNC: 10.8 G/DL — LOW (ref 11.5–15.5)
IRON SATN MFR SERPL: 14 % — SIGNIFICANT CHANGE UP (ref 14–50)
IRON SATN MFR SERPL: 36 UG/DL — SIGNIFICANT CHANGE UP (ref 30–160)
LDLC SERPL-MCNC: 72 MG/DL — SIGNIFICANT CHANGE UP
LIPID PNL WITH DIRECT LDL SERPL: 72 MG/DL — SIGNIFICANT CHANGE UP
MAGNESIUM SERPL-MCNC: 2 MG/DL — SIGNIFICANT CHANGE UP (ref 1.6–2.6)
MCHC RBC-ENTMCNC: 30.2 PG — SIGNIFICANT CHANGE UP (ref 27–34)
MCHC RBC-ENTMCNC: 32 G/DL — SIGNIFICANT CHANGE UP (ref 32–36)
MCV RBC AUTO: 94.1 FL — SIGNIFICANT CHANGE UP (ref 80–100)
NONHDLC SERPL-MCNC: 87 MG/DL — SIGNIFICANT CHANGE UP
NRBC BLD AUTO-RTO: 0 /100 WBCS — SIGNIFICANT CHANGE UP (ref 0–0)
PLATELET # BLD AUTO: 200 K/UL — SIGNIFICANT CHANGE UP (ref 150–400)
POTASSIUM SERPL-MCNC: 3.3 MMOL/L — LOW (ref 3.5–5.3)
POTASSIUM SERPL-SCNC: 3.3 MMOL/L — LOW (ref 3.5–5.3)
RBC # BLD: 3.58 M/UL — LOW (ref 3.8–5.2)
RBC # FLD: 12.5 % — SIGNIFICANT CHANGE UP (ref 10.3–14.5)
SODIUM SERPL-SCNC: 137 MMOL/L — SIGNIFICANT CHANGE UP (ref 135–145)
TIBC SERPL-MCNC: 263 UG/DL — SIGNIFICANT CHANGE UP (ref 220–430)
TRIGL SERPL-MCNC: 78 MG/DL — SIGNIFICANT CHANGE UP
TSH SERPL-MCNC: 1.6 UIU/ML — SIGNIFICANT CHANGE UP (ref 0.36–3.74)
UIBC SERPL-MCNC: 227 UG/DL — SIGNIFICANT CHANGE UP (ref 110–370)
VIT B12 SERPL-MCNC: 1673 PG/ML — HIGH (ref 232–1245)
VIT D25+D1,25 OH+D1,25 PNL SERPL-MCNC: 39.7 PG/ML — SIGNIFICANT CHANGE UP (ref 19.9–79.3)
WBC # BLD: 3.06 K/UL — LOW (ref 3.8–10.5)
WBC # FLD AUTO: 3.06 K/UL — LOW (ref 3.8–10.5)

## 2025-04-26 PROCEDURE — 76700 US EXAM ABDOM COMPLETE: CPT

## 2025-04-26 PROCEDURE — 99232 SBSQ HOSP IP/OBS MODERATE 35: CPT

## 2025-04-26 PROCEDURE — 80053 COMPREHEN METABOLIC PANEL: CPT

## 2025-04-26 PROCEDURE — 99285 EMERGENCY DEPT VISIT HI MDM: CPT

## 2025-04-26 PROCEDURE — 76700 US EXAM ABDOM COMPLETE: CPT | Mod: 26

## 2025-04-26 PROCEDURE — 80048 BASIC METABOLIC PNL TOTAL CA: CPT

## 2025-04-26 PROCEDURE — 94640 AIRWAY INHALATION TREATMENT: CPT

## 2025-04-26 PROCEDURE — 86140 C-REACTIVE PROTEIN: CPT

## 2025-04-26 PROCEDURE — 93005 ELECTROCARDIOGRAM TRACING: CPT

## 2025-04-26 PROCEDURE — 87637 SARSCOV2&INF A&B&RSV AMP PRB: CPT

## 2025-04-26 PROCEDURE — 83735 ASSAY OF MAGNESIUM: CPT

## 2025-04-26 PROCEDURE — 83550 IRON BINDING TEST: CPT

## 2025-04-26 PROCEDURE — 93306 TTE W/DOPPLER COMPLETE: CPT | Mod: 26

## 2025-04-26 PROCEDURE — 83036 HEMOGLOBIN GLYCOSYLATED A1C: CPT

## 2025-04-26 PROCEDURE — 84443 ASSAY THYROID STIM HORMONE: CPT

## 2025-04-26 PROCEDURE — 85730 THROMBOPLASTIN TIME PARTIAL: CPT

## 2025-04-26 PROCEDURE — 84484 ASSAY OF TROPONIN QUANT: CPT

## 2025-04-26 PROCEDURE — 93306 TTE W/DOPPLER COMPLETE: CPT

## 2025-04-26 PROCEDURE — 83540 ASSAY OF IRON: CPT

## 2025-04-26 PROCEDURE — 85652 RBC SED RATE AUTOMATED: CPT

## 2025-04-26 PROCEDURE — 36415 COLL VENOUS BLD VENIPUNCTURE: CPT

## 2025-04-26 PROCEDURE — 71275 CT ANGIOGRAPHY CHEST: CPT | Mod: MC

## 2025-04-26 PROCEDURE — 85379 FIBRIN DEGRADATION QUANT: CPT

## 2025-04-26 PROCEDURE — 85610 PROTHROMBIN TIME: CPT

## 2025-04-26 PROCEDURE — 80061 LIPID PANEL: CPT

## 2025-04-26 PROCEDURE — 99232 SBSQ HOSP IP/OBS MODERATE 35: CPT | Mod: GC

## 2025-04-26 PROCEDURE — 85025 COMPLETE CBC W/AUTO DIFF WBC: CPT

## 2025-04-26 PROCEDURE — 85027 COMPLETE CBC AUTOMATED: CPT

## 2025-04-26 PROCEDURE — 82607 VITAMIN B-12: CPT

## 2025-04-26 PROCEDURE — 82652 VIT D 1 25-DIHYDROXY: CPT

## 2025-04-26 PROCEDURE — 82746 ASSAY OF FOLIC ACID SERUM: CPT

## 2025-04-26 RX ORDER — ALBUTEROL SULFATE 2.5 MG/3ML
2 VIAL, NEBULIZER (ML) INHALATION EVERY 6 HOURS
Refills: 0 | Status: DISCONTINUED | OUTPATIENT
Start: 2025-04-26 | End: 2025-04-26

## 2025-04-26 RX ORDER — CYANOCOBALAMIN 1000 UG/ML
1000 INJECTION INTRAMUSCULAR; SUBCUTANEOUS DAILY
Refills: 0 | Status: DISCONTINUED | OUTPATIENT
Start: 2025-04-26 | End: 2025-04-26

## 2025-04-26 RX ORDER — CYCLOBENZAPRINE HYDROCHLORIDE 15 MG/1
5 CAPSULE, EXTENDED RELEASE ORAL ONCE
Refills: 0 | Status: COMPLETED | OUTPATIENT
Start: 2025-04-26 | End: 2025-04-26

## 2025-04-26 RX ORDER — ALBUTEROL SULFATE 2.5 MG/3ML
2 VIAL, NEBULIZER (ML) INHALATION
Qty: 1 | Refills: 0
Start: 2025-04-26

## 2025-04-26 RX ORDER — SENNA 187 MG
1 TABLET ORAL ONCE
Refills: 0 | Status: COMPLETED | OUTPATIENT
Start: 2025-04-26 | End: 2025-04-26

## 2025-04-26 RX ORDER — IBUPROFEN 200 MG
1 TABLET ORAL
Qty: 21 | Refills: 0
Start: 2025-04-26 | End: 2025-05-02

## 2025-04-26 RX ADMIN — Medication 50 MICROGRAM(S): at 06:32

## 2025-04-26 RX ADMIN — Medication 20 MILLIGRAM(S): at 12:10

## 2025-04-26 RX ADMIN — LACTULOSE 20 GRAM(S): 10 SOLUTION ORAL at 13:01

## 2025-04-26 RX ADMIN — CYCLOBENZAPRINE HYDROCHLORIDE 5 MILLIGRAM(S): 15 CAPSULE, EXTENDED RELEASE ORAL at 12:09

## 2025-04-26 RX ADMIN — FOLIC ACID 1 MILLIGRAM(S): 1 TABLET ORAL at 12:10

## 2025-04-26 RX ADMIN — Medication 1 TABLET(S): at 14:30

## 2025-04-26 RX ADMIN — Medication 2 PUFF(S): at 18:08

## 2025-04-26 RX ADMIN — Medication 1 TABLET(S): at 12:10

## 2025-04-26 RX ADMIN — CYANOCOBALAMIN 1000 MICROGRAM(S): 1000 INJECTION INTRAMUSCULAR; SUBCUTANEOUS at 14:30

## 2025-04-26 RX ADMIN — Medication 81 MILLIGRAM(S): at 12:10

## 2025-04-26 RX ADMIN — Medication 100 MILLIGRAM(S): at 14:30

## 2025-06-21 NOTE — ED PROVIDER NOTE - WET READ LAUNCH FT
Return to the nearest ED if you develop severe worsening pain, increased bleeding, or other concerning symptoms.  Follow-up with your OB/GYN early next week.   There is 1 Wet Read(s) to document. There are no Wet Read(s) to document.